# Patient Record
Sex: FEMALE | Race: WHITE
[De-identification: names, ages, dates, MRNs, and addresses within clinical notes are randomized per-mention and may not be internally consistent; named-entity substitution may affect disease eponyms.]

---

## 2017-08-29 ENCOUNTER — HOSPITAL ENCOUNTER (OUTPATIENT)
Dept: HOSPITAL 84 - D.CATH | Age: 72
Discharge: HOME | End: 2017-08-29
Attending: INTERNAL MEDICINE
Payer: MEDICARE

## 2017-08-29 VITALS — DIASTOLIC BLOOD PRESSURE: 61 MMHG | SYSTOLIC BLOOD PRESSURE: 152 MMHG

## 2017-08-29 VITALS — HEIGHT: 64 IN | WEIGHT: 174.37 LBS | BODY MASS INDEX: 29.77 KG/M2 | BODY MASS INDEX: 29.77 KG/M2

## 2017-08-29 DIAGNOSIS — I10: ICD-10-CM

## 2017-08-29 DIAGNOSIS — Z01.812: ICD-10-CM

## 2017-08-29 DIAGNOSIS — R06.02: ICD-10-CM

## 2017-08-29 DIAGNOSIS — I25.119: Primary | ICD-10-CM

## 2017-08-29 DIAGNOSIS — R94.30: ICD-10-CM

## 2017-08-29 DIAGNOSIS — I35.9: ICD-10-CM

## 2017-08-29 LAB
ANION GAP SERPL CALC-SCNC: 13.3 MMOL/L (ref 8–16)
BASOPHILS NFR BLD AUTO: 0.4 % (ref 0–2)
BUN SERPL-MCNC: 19 MG/DL (ref 7–18)
CALCIUM SERPL-MCNC: 9.2 MG/DL (ref 8.5–10.1)
CHLORIDE SERPL-SCNC: 108 MMOL/L (ref 98–107)
CO2 SERPL-SCNC: 26.2 MMOL/L (ref 21–32)
CREAT SERPL-MCNC: 0.9 MG/DL (ref 0.6–1.3)
EOSINOPHIL NFR BLD: 2.7 % (ref 0–7)
ERYTHROCYTE [DISTWIDTH] IN BLOOD BY AUTOMATED COUNT: 13.6 % (ref 11.5–14.5)
GLUCOSE SERPL-MCNC: 90 MG/DL (ref 74–106)
HCT VFR BLD CALC: 35.4 % (ref 36–48)
HGB BLD-MCNC: 12.3 G/DL (ref 12–16)
IMM GRANULOCYTES NFR BLD: 0.2 % (ref 0–5)
LYMPHOCYTES NFR BLD AUTO: 30.7 % (ref 15–50)
MCH RBC QN AUTO: 34.2 PG (ref 26–34)
MCHC RBC AUTO-ENTMCNC: 34.7 G/DL (ref 31–37)
MCV RBC: 98.3 FL (ref 80–100)
MONOCYTES NFR BLD: 6.7 % (ref 2–11)
NEUTROPHILS NFR BLD AUTO: 59.3 % (ref 40–80)
OSMOLALITY SERPL CALC.SUM OF ELEC: 286 MOSM/KG (ref 275–300)
PLATELET # BLD: 189 10X3/UL (ref 130–400)
PMV BLD AUTO: 11.4 FL (ref 7.4–10.4)
POTASSIUM SERPL-SCNC: 4.5 MMOL/L (ref 3.5–5.1)
RBC # BLD AUTO: 3.6 10X6/UL (ref 4–5.4)
SODIUM SERPL-SCNC: 143 MMOL/L (ref 136–145)
WBC # BLD AUTO: 5.5 10X3/UL (ref 4.8–10.8)

## 2017-08-29 NOTE — HEMODYNAMI
PATIENT:MAYRA MARTINEZ                                MEDICAL RECORD: [id number]
: 49                                            LOCATION:[facility]          
ACCT# [id number]                                       ADMISSION DATE: 17
 
 
 Generatedon:201713:49
Patient name: MAYRA MARTINEZ Patient #: [id number] Visit #: [id number] SSN: 4
81-
: 1945 Date of study: 2017
Page: Of
Hemodynamic Procedure Report
****************************
Patient Data
Patient Demographics
Procedure consent was obtained
First Name: MAYRA        Gender: Female
Last Name: MICHELLE           : 1945
Middle Initial: J           Age: 72 year(s)
Patient #: [id number]       Race: 
Visit #: [id number]
SSN: 
Accession #:
[id number]
Additional ID: [id number]
Contact details
Address: [address] [place] [address]       Phone: [phone]
State: [place]
City: [facility]
Zip code: [address]
Past Medical History
Allergies
Allergen        Reaction        Date         Comments
Reported
Other allergy                   2017    Ace Inhibitors
Admission
Admission Data
Admission Date: 2017   Admission Time: 11:04
Arrival Date: 2017     Arrival Time: 13:00
Admit Source: Other         Insurance Payor: Medicare
Height (in.): 64            BSA: 1.84 (m2)
Height (cm.): 162.56        BMI: 29.87 (kg/m2)
Weight (lbs.): 174
Weight (kg.): 78.93
Lab Results
Lab Result Date: 2017  Lab Result Time: 0:00
Biochemistry
Name         Units    Result                Min      Max
BUN          mg/dl    19       --(----)*-   7        18
Creatinine   mg/dl    0.9      --(-*--)--   0.6      1.3
CBC
Name         Units    Result                Min      Max
Hemoglobin   g/dl     12.3     *-(----)--   13.5     17.5
Procedure
Procedure Types
Cath Procedure
Diagnostic Procedure
C
Coronaries only
Aortic Root Angiography
 
Miscellaneous Procedures
Moderate Sedation up to 30 minutes
Procedure Description
Procedure Date
Procedure Date: 2017
Procedure Start Time: 13:23
Procedure End Time: 13:48
Procedure Staff
Name                            Function
Tramaine Lopez MD                   Performing Physician
Anna Trinidad RT               Scrub
Timoteo Anaya RT                  Scrub
Andres Wilkinson RN                Nurse
Beatriz Shane RT                    Monitor
Procedure Data
Cath Procedure
Fluoroscopy
Diagnostic fluoroscopy      Total fluoroscopy Time: 5.1
time: 5.1 min               min
Diagnostic fluoroscopy      Total fluoroscopy dose: 570
dose: 570 mGy               mGy
Contrast Material
Contrast Material Type                       Amount (ml)
Isovue 300                                   126
Entry Location
Entry     Primary  Successful  Side  Size  Upsize Upsize Entry    Closure Succes
sful  Closure
Location                             (Fr)  1 (Fr) 2 (Fr) Remarks  Device        
      Remarks
Femoral                        Right 5 Fr                         Exoseal
artery
Estimated blood loss: 5 ml
Diagnostic catheters
Device Type               Used For           End Catheter
Placement
Cordis 5Fr JL 4.0         Left Coronary
Catheter (MP)             Angiography
Cordis 5Fr 3DRC Catheter  Right Coronary
(MP)                      Angiography
Cordis 5Fr Pigtail        LV Angiography
Catheter (MP)
Diagnostic Infinity 5Fr   Multi-vessel
IM catheter               Angiography
Procedure Complications
No complications
Procedure Medications
Medication           Administration Route Dosage
Versed               I.V.                 1 mg
Fentanyl             I.V.                 50 mcg
Versed               I.V.                 1 mg
Fentanyl             I.V.                 50 mcg
Hemodynamics
Rest
BSA: 1.84 (m2) HGB: 12.3 (g/dl) O2 Consumption: Estimated: 162.18 (ml/min) O2 Co
nsumption
indexed: Estimated:88.14 (ml/min/m) Heart Rate: 59 (bpm)
Snapshots
Pre Cath      Intra         NCS           Post Cath
Vital Signs
Time     Heart  Resp   SPO2 etCO2   PY2kubg NIBP (mmHg) Rhythm  Pain    Sedation
 
Rate   (ipm)  (%)  (mmHg)  (mmHg)                      Status  Level
(bpm)
13:08:54 57     28     99   0       0       168/65(124) NSR     0 (11)  10(A)
, No
pain
13:13:23 56     29     96   0       0       161/66(113) NSR     0 (11)  10(A)
, No
pain
13:17:37 70     16     90   0       0       138/73(100) NSR     0 (11)  10(A)
, No
pain
13:21:59 56     19     95   0       0       136/60(118) NSR     0 (11)  9(A)
, No
pain
13:26:13 63     16     96   0       0       133/68(78)  NSR     0 (11)  9(A)
, No
pain
13:30:27 70     16     90   0       0       132/67(92)  NSR     0 (11)  9(A)
, No
pain
13:34:49 68     18     92   0       0       123/57(92)  NSR     0 (11)  9(A)
, No
pain
13:39:01 71     20     93   0       0       133/69(108) NSR     0 (11)  9(A)
, No
pain
13:43:19 70     19     93   0       0       142/68(96)  NSR     0 (11)  9(A)
, No
pain
13:47:37 69     9      94   0       0       137/71(106) NSR     0 (11)  10(A)
, No
pain
Medications
Time     Medication  Route  Dose  Verified Delivered Reason   Notes  Effectivene
ss
by       by
13:16:14 Versed      I.V.   1 mg  Andres   Andres    for
Minh Wilkinson RN sedation
RN
13:16:34 Fentanyl    I.V.   50    Andres   Andres    for
misty Wilkinson RN sedation
RN
13:21:47 Versed      I.V.   1 mg  Andres Singhy    for
Minh Wilkinson RN sedation
RN
13:21:55 Fentanyl    I.V.   50    Andres   Andres    for
misty Wilkinson RN sedation
RN
Procedure Log
Time     Note
12:50:29 Andres Wilkinson RN sent for patient. Start room use.
13:01:50 Diagnostic Cath Status : Elective
13:02:31 Patient Height : 64 cm
13:02:35 Patient Weight : 174 kg
13:02:35 Admit Source: Other
13:03:14 Insurance Payor : Medicare
13:03:20 Arrival Date: 2017 1:00:00 PM
13:03:38 Time tracking: Regular hours
13:03:45 Plan of Care:Hemodynamics will remain stable., Cardiac
rhythm will remain stable., Comfort level will be
 
maintained., Respiratory function will remain
adequate., Patient/ family verbilizes understanding of
procedure., Procedure tolerated without complication.,
Recovers from procedure without complications..
13:04:28 Patient received from Pre/Post Procedure Room to [facility] 1
Alert and oriented. Tansferred to table in Supine
position.
13:04:31 Warm blankets applied, and shira hugger turned on for
patient comfort.
13:04:32 Correct patient and procedure confirmed by team.
13:04:34 Signed procedure consent form obtained from patient.
13:04:35 ECG and BP/O2 sat monitors applied to patient.
13:07:33 Vital chart was started
13:09:57 Baseline sample Acquired.
13:10:34 H&P Date Dictated: 2017 Within 30 days and on
chart., H&P Addendum completed by physician on day of
procedure. (MUST COMPLETE FOR ALL OUTPATIENTS).
13:10:36 Pre-procedure instructions explained to patient.
13:10:36 Pre-op teaching completed and patient verbalized
understanding.
13:10:38 Family in waiting room.
13:10:39 Patient NPO since Midnight.
13:10:55 Patient allergic to Other allergyAce Inhibitors
13:11:08 Is the patient allergic to Iodine/contrast media? No.
13:11:10 Was the patient premedicated? No
13:11:13 Is patient on blood thinner?Yes
13:11:18 **ACC** The patient was administered the following
blood thiners within the last 24 hours: **ACC**Plavix
13:13:10 Patient diabetic? No.
13:13:12 Previous problem with sedation/anesthesia? No ?
13:13:15 Snore? Yes
13:13:16 Sleep apnea? No
13:13:18 Deviated septum? No
13:13:18 Opens mouth fully? Yes
13:13:19 Sticks out tongue? Yes
13:13:22 Airway obstruction? No ?
13:13:25 Dentures? No ?
13:13:30 Pre procedure: right dorsailis pedis pulse 1+
Palpable, but thready & weak; easily obliterated
13:13:32 Patient pain scale 0/10 ?.
13:13:43 IV patent on arrival in left forearm with 0.9% NaCl at
O.
13:15:58 Lab Result : Creatinine 0.9 mg/dl
13:15:58 Lab Result : BUN 19 mg/dl
13:15:58 Lab Result : Hemoglobin 12.3 g/dl
13:16:04 Lab results completed and on chart.
13:16:07 Right groin area was prepped with chlora-prep and
draped in sterile fashion
13:16:09 Alarms reviewed by R. N.
13:16:09 Sharps counted by scrub and verified by R.N.
13:16:12 Physician arrived
13:16:12 --------ALL STOP TIME OUT------
13:16:13 Final Timeout: patient, procedure, and site verified
with staff and physician. All members of the team are
in agreement.
13:16:14 Versed 1 mg I.V. was administered by Andres Wilkinson RN;
for sedation;
13:16:16 Right groin site verified by team.
13:16:19 Physical assessment completed. ASA score P 2 - A
patient with mild systemic disease as per Tramaine Lopez MD.
13:16:23 Sedation plan: IV Moderate Sedation Versed, Fentanyl
13:16:33 Use device set Femoral Dx
13:16:34 Fentanyl 50 mcg I.V. was administered by Andres Wilkinson
RN; for sedation;
13:16:35 Acist Syringe opened to sterile field.
13:16:35 Bag Decanter opened to sterile field.
13:16:36 Medline Cath Pack opened to sterile field.
13:16:36 Terumo 5Fr [place] Sheath opened to sterile field.
13:16:37 [place] 260cm J .035 wire opened to sterile field.
13:16:38 Acist Hand Control opened to sterile field.
13:16:39 Acist Manifold opened to sterile field.
13:16:39 Diagnostic Infinity 5Fr Multipack catheter opened to
sterile field.
13:16:40 Tegaderm 4 x 4 opened to sterile field.
13:19:12 Zero performed for pressure channel P1
13:19:20 Zero performed for pressure channel P1
13:21:47 Versed 1 mg I.V. was administered by Andres Wilkinson RN;
for sedation;
13:21:55 Fentanyl 50 mcg I.V. was administered by Andres Wilkinson
RN; for sedation;
13:23:30 Procedure started.
13:23:30 Full Disclosure recording started
13:23:34 Local anesthetic to right femoral artery with
Lidocaine 2% by Tramaine Lopez MD.**INITIAL ACCESS ONLY**
13:23:55 A 5 Fr sheath was inserted into the Right Femoral
artery
13:26:20 A Cordis 5Fr JL 4.0 Catheter (EARNEST) was advanced over
the wire and used for Left Coronary Angiography.
13:27:45 LCA angiography performed.
13:27:49 Injector settings: Ml/sec: 3, Volume: 6,
13:30:35 Catheter removed.
13:30:57 A Cordis 5Fr 3DRC Catheter (MP) was advanced over the
wire and used for Right Coronary Angiography.
13:31:46 RCA angiography performed.
13:31:50 Injector settings: Ml/sec: 3, Volume: 6,
13:34:14 Catheter removed.
13:34:21 A Cordis 5Fr Pigtail Catheter (MP) was advanced over
the wire and used for LV Angiography.
13:35:46 Aortic Root visualized
13:36:32 Catheter removed.
13:37:06 A Diagnostic Infinity 5Fr IM catheter was advanced
over the wire and used for Multi-vessel Angiography.
13:38:43 Bilateral carotid angiography performed.
13:42:41 Catheter removed.
13:45:02 Cordis 5Fr Exoseal opened to sterile field.
13:45:23 Sheath removed intact; hemostasis achieved with
Exoseal to the Right Femoral artery.
13:45:26 Procedure ended.(Physican Out)
13:46:27 Fluoroscopy time 05.10 minutes.
13:46:31 Fluoroscopy dose: 570 mGy
13:46:31 Flurop Dose total: 570
13:47:02 Contrast amount:Isovue 300 126ml.
13:47:04 Sharps counted by scrub and verified by R.N.
13:47:05 Insertion/operative site no bleeding no hematoma.
13:47:08 Post-op/insertion site Right Femoral artery dressed
using a 4 x 4 and Tegaderm.
13:47:16 Post right femoral artery:stable
13:47:18 Post Procedure Pulses reassessed and unchanged
13:47:21 Post procedure rhythm: unchanged.
 
13:47:24 Estimated blood loss: 5 ml
13:47:26 Post procedure instruction explained to
patient.Patient verbalizes understanding.
13:47:26 Patient needs reinforcement of post procedure
teaching.
13:48:06 Procedure type changed to Cath procedure, Diagnostic
procedure, LHC, Coronaries only, Aortic Root
Angiography, Miscellaneous Procedures, Moderate
Sedation up to 30 minutes
13:48:08 Procedure and supply charges have been captured,
reviewed, submitted and are correct.
13:48:14 Procedure Complication : No complications
13:48:19 Vital chart was stopped
13:48:19 See physician's report for complete and final results.
13:48:22 Report given to Pre/Post Procedure Room.
13:48:26 Patient transfered to Pre/Post Procedure Room with
Stretcher.
13:48:33 Procedure ended.
13:48:33 Full Disclosure recording stopped
13:48:41 End room use (Document Last)
Device Usage
Item Name  Manufacture  Quantity  Catalog    Hospital Part    Current Minimal Lo
t# /
Number     Charge   Number  Stock   Stock   Serial#
Code
Acist      Acist        1         47001      621462   724395  723820  20
Syringe    Medical
Systems Inc
Bag        Microtek     1         2002S      656528   58227   127382  5
Decanter   Medical Inc.
Medline    Cardinal     1         [id number]  496299   76627   243795  5
Cath Pack  Health
Terumo 5Fr Terumo       1         [id number]     900113   023488  619140  40
[place]
Sheath
[facility] [place]    [employer]      1         879360     827250   078399  913020  30
260cm J
.035 wire
Acist Hand Acist        1         30743      517083   005899  290820  5
Control    Medical
Systems Inc
Acist      Acist        1         65318      551744   668350  718685  5
Manifold   Medical
Systems Inc
Diagnostic Cardinal     1         [id number]     576289   41937   643732  30
Infinity   Health
5Fr
Multipack
catheter
Tegaderm 4 3M           1         1626W      906531   000010  355223  5
x 4
Cordis 5Fr Cardinal     1                                     247762  5
JL 4.0     Health
Catheter
(MP)
Cordis 5Fr Cardinal     1                                     076566  5
3DRC       Health
Catheter
(MP)
Cordis 5Fr Cardinal     1                                     578382  5
 
Pigtail    Health
Catheter
(MP)
Diagnostic Cardinal     1         [id number]    665169   388304  826597  5
Infinity   Health
5Fr IM
catheter
Cordis 5Fr Cardinal     1               009063   683137  804813  10
[facility]    [employer]
Signature Audit [place]
Stage           Time        Signature      Unsigned
Intra-Procedure 2017   Beatriz Shane
1:49:55 PM  RT(R)
Signatures
Monitor : Beatriz Shane RT   Signature :
______________________________
Date : ______________ Time :
______________
 
 
 
 
 
 
 
 
 
 
 
 
 
 
 
 
 
 
 
 
 
 
 
 
 
 
 
 
 
 
 
 
 
 
 
 
 
[facility]                                          
[address] [place] [address]

## 2017-08-29 NOTE — NUR
IV D'C WITH CATH TIP INTACT, UP TO RESTROOM- VOID.  WRITTEN AND
VERBAL D'C INSTRUCTIONS GIVEN TO PT AND FRIEND. DENIES PAIN OR
FURTHUR NEEDS.

## 2017-08-31 ENCOUNTER — HOSPITAL ENCOUNTER (OUTPATIENT)
Dept: HOSPITAL 84 - D.CT | Age: 72
Discharge: HOME | End: 2017-08-31
Attending: INTERNAL MEDICINE
Payer: MEDICARE

## 2017-08-31 VITALS — BODY MASS INDEX: 29.9 KG/M2

## 2017-08-31 DIAGNOSIS — I65.23: Primary | ICD-10-CM

## 2017-09-08 ENCOUNTER — HOSPITAL ENCOUNTER (OUTPATIENT)
Dept: HOSPITAL 84 - D.US | Age: 72
Discharge: HOME | End: 2017-09-08
Attending: THORACIC SURGERY (CARDIOTHORACIC VASCULAR SURGERY)
Payer: MEDICARE

## 2017-09-08 VITALS — BODY MASS INDEX: 29.9 KG/M2

## 2017-09-08 DIAGNOSIS — R09.89: ICD-10-CM

## 2017-09-08 DIAGNOSIS — I25.10: Primary | ICD-10-CM

## 2017-09-08 DIAGNOSIS — Z01.812: ICD-10-CM

## 2017-09-09 LAB — HCV AB S/CO SERPL IA: <0.1 (ref 0–0.9)

## 2017-09-14 LAB
ALBUMIN SERPL-MCNC: 4 G/DL (ref 3.4–5)
ALP SERPL-CCNC: 74 U/L (ref 46–116)
ALT SERPL-CCNC: 44 U/L (ref 10–68)
ANION GAP SERPL CALC-SCNC: 9.3 MMOL/L (ref 8–16)
APPEARANCE UR: CLEAR
APTT BLD: 28.9 SECONDS (ref 22.8–39.4)
BASOPHILS NFR BLD AUTO: 0.2 % (ref 0–2)
BILIRUB SERPL-MCNC: 0.51 MG/DL (ref 0.2–1.3)
BILIRUB SERPL-MCNC: NEGATIVE MG/DL
BUN SERPL-MCNC: 17 MG/DL (ref 7–18)
CA TITR SERPL AGGL: (no result) {TITER}
CALCIUM SERPL-MCNC: 9.2 MG/DL (ref 8.5–10.1)
CHLORIDE SERPL-SCNC: 106 MMOL/L (ref 98–107)
CO2 SERPL-SCNC: 30.7 MMOL/L (ref 21–32)
COLD SCREEN ROOM TEMP: NEGATIVE
COLOR UR: YELLOW
CREAT SERPL-MCNC: 0.9 MG/DL (ref 0.6–1.3)
EOSINOPHIL NFR BLD: 3.2 % (ref 0–7)
ERYTHROCYTE [DISTWIDTH] IN BLOOD BY AUTOMATED COUNT: 14 % (ref 11.5–14.5)
EST. AVERAGE GLUCOSE BLD GHB EST-MCNC: 100 MG/DL (ref 74–154)
GLOBULIN SER-MCNC: 3.5 G/L
GLUCOSE SERPL-MCNC: 96 MG/DL (ref 74–106)
GLUCOSE SERPL-MCNC: NEGATIVE MG/DL
HBA1C MFR BLD: 5.1 % (ref 4.8–6)
HCT VFR BLD CALC: 35.4 % (ref 36–48)
HGB BLD-MCNC: 12.1 G/DL (ref 12–16)
IMM GRANULOCYTES NFR BLD: 0 % (ref 0–5)
INR PPP: 0.95 (ref 0.85–1.17)
KETONES UR STRIP-MCNC: NEGATIVE MG/DL
LEUKOCYTE ESTERASE: NEGATIVE
LYMPHOCYTES NFR BLD AUTO: 29.3 % (ref 15–50)
MCH RBC QN AUTO: 34.2 PG (ref 26–34)
MCHC RBC AUTO-ENTMCNC: 34.2 G/DL (ref 31–37)
MCV RBC: 100 FL (ref 80–100)
MONOCYTES NFR BLD: 7.2 % (ref 2–11)
NEUTROPHILS NFR BLD AUTO: 60.1 % (ref 40–80)
NITRITE UR-MCNC: NEGATIVE MG/ML
OSMOLALITY SERPL CALC.SUM OF ELEC: 282 MOSM/KG (ref 275–300)
PA ADP PRP-ACNC: 257 PRU (ref 194–418)
PH UR STRIP: 7 [PH] (ref 5–6)
PHOSPHATE SERPL-MCNC: 3.9 MG/DL (ref 2.5–4.9)
PLATELET # BLD: 167 10X3/UL (ref 130–400)
PMV BLD AUTO: 11.7 FL (ref 7.4–10.4)
POTASSIUM SERPL-SCNC: 5 MMOL/L (ref 3.5–5.1)
PROT SERPL-MCNC: 7.5 G/DL (ref 6.4–8.2)
PROT UR-MCNC: NEGATIVE MG/DL
PROTHROMBIN TIME: 12.5 SECONDS (ref 11.6–15)
RBC # BLD AUTO: 3.54 10X6/UL (ref 4–5.4)
SODIUM SERPL-SCNC: 141 MMOL/L (ref 136–145)
SP GR UR STRIP: 1.01 (ref 1–1.02)
T4 FREE SERPL-MCNC: 1.01 NG/DL (ref 0.76–1.46)
TSH SERPL-ACNC: 4.32 UIU/ML (ref 0.36–3.74)
URATE SERPL-MCNC: 5.6 MG/DL (ref 2.6–7.2)
UROBILINOGEN UR-MCNC: NORMAL MG/DL
WBC # BLD AUTO: 5.3 10X3/UL (ref 4.8–10.8)

## 2017-09-15 ENCOUNTER — HOSPITAL ENCOUNTER (INPATIENT)
Dept: HOSPITAL 84 - D.SDCHOLD | Age: 72
LOS: 8 days | Discharge: HOME | DRG: 236 | End: 2017-09-23
Attending: THORACIC SURGERY (CARDIOTHORACIC VASCULAR SURGERY) | Admitting: THORACIC SURGERY (CARDIOTHORACIC VASCULAR SURGERY)
Payer: MEDICARE

## 2017-09-15 VITALS — SYSTOLIC BLOOD PRESSURE: 121 MMHG | DIASTOLIC BLOOD PRESSURE: 53 MMHG

## 2017-09-15 VITALS — DIASTOLIC BLOOD PRESSURE: 49 MMHG | SYSTOLIC BLOOD PRESSURE: 120 MMHG

## 2017-09-15 VITALS — SYSTOLIC BLOOD PRESSURE: 129 MMHG | DIASTOLIC BLOOD PRESSURE: 58 MMHG

## 2017-09-15 VITALS — DIASTOLIC BLOOD PRESSURE: 65 MMHG | SYSTOLIC BLOOD PRESSURE: 137 MMHG

## 2017-09-15 VITALS — SYSTOLIC BLOOD PRESSURE: 117 MMHG | DIASTOLIC BLOOD PRESSURE: 60 MMHG

## 2017-09-15 VITALS — SYSTOLIC BLOOD PRESSURE: 119 MMHG | DIASTOLIC BLOOD PRESSURE: 48 MMHG

## 2017-09-15 VITALS — DIASTOLIC BLOOD PRESSURE: 46 MMHG | SYSTOLIC BLOOD PRESSURE: 104 MMHG

## 2017-09-15 VITALS — DIASTOLIC BLOOD PRESSURE: 58 MMHG | SYSTOLIC BLOOD PRESSURE: 141 MMHG

## 2017-09-15 VITALS — SYSTOLIC BLOOD PRESSURE: 107 MMHG | DIASTOLIC BLOOD PRESSURE: 42 MMHG

## 2017-09-15 VITALS — SYSTOLIC BLOOD PRESSURE: 117 MMHG | DIASTOLIC BLOOD PRESSURE: 42 MMHG

## 2017-09-15 VITALS — DIASTOLIC BLOOD PRESSURE: 46 MMHG | SYSTOLIC BLOOD PRESSURE: 106 MMHG

## 2017-09-15 VITALS — SYSTOLIC BLOOD PRESSURE: 108 MMHG | DIASTOLIC BLOOD PRESSURE: 43 MMHG

## 2017-09-15 VITALS — DIASTOLIC BLOOD PRESSURE: 46 MMHG | SYSTOLIC BLOOD PRESSURE: 111 MMHG

## 2017-09-15 VITALS — DIASTOLIC BLOOD PRESSURE: 53 MMHG | SYSTOLIC BLOOD PRESSURE: 134 MMHG

## 2017-09-15 VITALS — DIASTOLIC BLOOD PRESSURE: 46 MMHG | SYSTOLIC BLOOD PRESSURE: 97 MMHG

## 2017-09-15 VITALS — SYSTOLIC BLOOD PRESSURE: 168 MMHG | DIASTOLIC BLOOD PRESSURE: 77 MMHG

## 2017-09-15 VITALS
BODY MASS INDEX: 30.45 KG/M2 | HEIGHT: 64 IN | BODY MASS INDEX: 30.45 KG/M2 | WEIGHT: 178.37 LBS | WEIGHT: 178.37 LBS | HEIGHT: 64 IN | BODY MASS INDEX: 30.45 KG/M2 | BODY MASS INDEX: 30.45 KG/M2

## 2017-09-15 VITALS — DIASTOLIC BLOOD PRESSURE: 46 MMHG | SYSTOLIC BLOOD PRESSURE: 115 MMHG

## 2017-09-15 VITALS — DIASTOLIC BLOOD PRESSURE: 58 MMHG | SYSTOLIC BLOOD PRESSURE: 142 MMHG

## 2017-09-15 VITALS — SYSTOLIC BLOOD PRESSURE: 117 MMHG | DIASTOLIC BLOOD PRESSURE: 66 MMHG

## 2017-09-15 VITALS — DIASTOLIC BLOOD PRESSURE: 47 MMHG | SYSTOLIC BLOOD PRESSURE: 125 MMHG

## 2017-09-15 VITALS — DIASTOLIC BLOOD PRESSURE: 46 MMHG | SYSTOLIC BLOOD PRESSURE: 117 MMHG

## 2017-09-15 VITALS — DIASTOLIC BLOOD PRESSURE: 50 MMHG | SYSTOLIC BLOOD PRESSURE: 120 MMHG

## 2017-09-15 VITALS — SYSTOLIC BLOOD PRESSURE: 105 MMHG | DIASTOLIC BLOOD PRESSURE: 45 MMHG

## 2017-09-15 VITALS — SYSTOLIC BLOOD PRESSURE: 123 MMHG | DIASTOLIC BLOOD PRESSURE: 47 MMHG

## 2017-09-15 VITALS — SYSTOLIC BLOOD PRESSURE: 120 MMHG | DIASTOLIC BLOOD PRESSURE: 56 MMHG

## 2017-09-15 VITALS — SYSTOLIC BLOOD PRESSURE: 143 MMHG | DIASTOLIC BLOOD PRESSURE: 63 MMHG

## 2017-09-15 VITALS — SYSTOLIC BLOOD PRESSURE: 117 MMHG | DIASTOLIC BLOOD PRESSURE: 46 MMHG

## 2017-09-15 VITALS — DIASTOLIC BLOOD PRESSURE: 47 MMHG | SYSTOLIC BLOOD PRESSURE: 121 MMHG

## 2017-09-15 VITALS — DIASTOLIC BLOOD PRESSURE: 47 MMHG | SYSTOLIC BLOOD PRESSURE: 108 MMHG

## 2017-09-15 VITALS — DIASTOLIC BLOOD PRESSURE: 60 MMHG | SYSTOLIC BLOOD PRESSURE: 114 MMHG

## 2017-09-15 VITALS — SYSTOLIC BLOOD PRESSURE: 145 MMHG | DIASTOLIC BLOOD PRESSURE: 63 MMHG

## 2017-09-15 VITALS — DIASTOLIC BLOOD PRESSURE: 44 MMHG | SYSTOLIC BLOOD PRESSURE: 113 MMHG

## 2017-09-15 VITALS — DIASTOLIC BLOOD PRESSURE: 58 MMHG | SYSTOLIC BLOOD PRESSURE: 133 MMHG

## 2017-09-15 VITALS — SYSTOLIC BLOOD PRESSURE: 113 MMHG | DIASTOLIC BLOOD PRESSURE: 44 MMHG

## 2017-09-15 VITALS — SYSTOLIC BLOOD PRESSURE: 116 MMHG | DIASTOLIC BLOOD PRESSURE: 46 MMHG

## 2017-09-15 VITALS — SYSTOLIC BLOOD PRESSURE: 113 MMHG | DIASTOLIC BLOOD PRESSURE: 43 MMHG

## 2017-09-15 DIAGNOSIS — I73.9: ICD-10-CM

## 2017-09-15 DIAGNOSIS — E03.9: ICD-10-CM

## 2017-09-15 DIAGNOSIS — I10: ICD-10-CM

## 2017-09-15 DIAGNOSIS — R91.1: ICD-10-CM

## 2017-09-15 DIAGNOSIS — J98.11: ICD-10-CM

## 2017-09-15 DIAGNOSIS — I25.10: Primary | ICD-10-CM

## 2017-09-15 DIAGNOSIS — E78.5: ICD-10-CM

## 2017-09-15 DIAGNOSIS — Z87.891: ICD-10-CM

## 2017-09-15 LAB
ANION GAP SERPL CALC-SCNC: 14.5 MMOL/L (ref 8–16)
APTT BLD: 36.4 SECONDS (ref 22.8–39.4)
BUN SERPL-MCNC: 16 MG/DL (ref 7–18)
CALCIUM SERPL-MCNC: 7.3 MG/DL (ref 8.5–10.1)
CHLORIDE SERPL-SCNC: 113 MMOL/L (ref 98–107)
CO2 SERPL-SCNC: 27.5 MMOL/L (ref 21–32)
CREAT SERPL-MCNC: 0.7 MG/DL (ref 0.6–1.3)
ERYTHROCYTE [DISTWIDTH] IN BLOOD BY AUTOMATED COUNT: 14.9 % (ref 11.5–14.5)
GLUCOSE SERPL-MCNC: 195 MG/DL (ref 74–106)
HCT VFR BLD CALC: 30.4 % (ref 36–48)
HGB BLD-MCNC: 10.7 G/DL (ref 12–16)
INR PPP: 1.42 (ref 0.85–1.17)
MCH RBC QN AUTO: 33.1 PG (ref 26–34)
MCHC RBC AUTO-ENTMCNC: 35.2 G/DL (ref 31–37)
MCV RBC: 94.1 FL (ref 80–100)
OSMOLALITY SERPL CALC.SUM OF ELEC: 305 MOSM/KG (ref 275–300)
PA ADP PRP-ACNC: 249 PRU (ref 194–418)
PLATELET # BLD: 106 10X3/UL (ref 130–400)
PMV BLD AUTO: 11 FL (ref 7.4–10.4)
POTASSIUM SERPL-SCNC: 4 MMOL/L (ref 3.5–5.1)
PROTHROMBIN TIME: 17.2 SECONDS (ref 11.6–15)
RBC # BLD AUTO: 3.23 10X6/UL (ref 4–5.4)
SODIUM SERPL-SCNC: 151 MMOL/L (ref 136–145)
WBC # BLD AUTO: 10.2 10X3/UL (ref 4.8–10.8)

## 2017-09-15 PROCEDURE — 5A1221Z PERFORMANCE OF CARDIAC OUTPUT, CONTINUOUS: ICD-10-PCS | Performed by: THORACIC SURGERY (CARDIOTHORACIC VASCULAR SURGERY)

## 2017-09-15 PROCEDURE — B245ZZ4 ULTRASONOGRAPHY OF LEFT HEART, TRANSESOPHAGEAL: ICD-10-PCS | Performed by: INTERNAL MEDICINE

## 2017-09-15 PROCEDURE — 02100AC BYPASS CORONARY ARTERY, ONE ARTERY FROM THORACIC ARTERY WITH AUTOLOGOUS ARTERIAL TISSUE, OPEN APPROACH: ICD-10-PCS | Performed by: THORACIC SURGERY (CARDIOTHORACIC VASCULAR SURGERY)

## 2017-09-15 PROCEDURE — 021109W BYPASS CORONARY ARTERY, TWO ARTERIES FROM AORTA WITH AUTOLOGOUS VENOUS TISSUE, OPEN APPROACH: ICD-10-PCS | Performed by: THORACIC SURGERY (CARDIOTHORACIC VASCULAR SURGERY)

## 2017-09-15 PROCEDURE — 06BP0ZZ EXCISION OF RIGHT SAPHENOUS VEIN, OPEN APPROACH: ICD-10-PCS | Performed by: THORACIC SURGERY (CARDIOTHORACIC VASCULAR SURGERY)

## 2017-09-15 NOTE — NUR
ABG'S DRAWN FROM RT RADIAL A-LINE WITHOUT DIFFICULTY. WILL TRANSFUSE 2 UNITS
PRBC'S, AND 10MEQ KCL. WILL MONITOR. 1:1 NURSING CARE IN PROGRESS.

## 2017-09-15 NOTE — NUR
1ST UNIT OF PRBC'S STARTED VIA RT IJ CORDIS. REASSESSMENT COMPLETED. SEE
ASSESSMENT FLOWSHEET. NO RUB HEARD IN LUNG LOBE VASQUEZ. TOLERATING WATER.
DENIES NAUSEA. HYPOACTIVE BOWEL SOUNDS NOW HEARD. WILL MONITOR.

## 2017-09-15 NOTE — NUR
2100 MEDS GIVEN EASILY WITH WATER. NO DIFFICULTY SWALLOWING. WANTS MORE WATER
BUT WILL HOLD OFF 30 MINUTES TO ONE HOUR DUE TO NO BOWEL SOUNDS HEARD YET.
MORPHINE PCA BUTTON PRESSED, WILL TURN ONCE PAIN MEDS KICKS IN. WILL MONITOR.

## 2017-09-15 NOTE — NUR
DR RYAN CALLED WITH LATEST BLOOD GAS TOTALS. ASKS FOR 20MEQ OVER 2 HOURS TO
BE GIVEN TO COVER LOW POTASSIUM.

## 2017-09-15 NOTE — TEE
PATIENT:MAYRA MARTINEZ               MEDICAL RECORD: [id number]
                                               LOCATION:[place] [address]
AGE OF PATIENT: 72                             ADMISSION DATE: 09/15/17
SEX: F   
 
REFERRING PHYSICIAN:                                                             
 
INTERPRETING PHYSICIAN: ROSA LEES MD             

 
 
                         TRANSESOPHAGEAL ECHOCARDIOGRAM
                 KALEB CHARGE  Y
                INDICATIONS: CABG                     
 
             PREMEDICATIONS:                               
 
PATIENT'S RESPONSE PROCEDURE                          
 
                     DOPPLER MEASUREMENTS:
            LVIT            LA           PA           RA     
            LVOT          RVOT      Asc. Ao     
AV Gradient Peak       AV Mean      AV Area     
MV Gradient Peak       MV Mean      MV Area     
 INTERPRETATION:                          
 
 
 
 
               Doppler:                          
 
                   2-D: EF 50 %                  
 
     COLOR FLOW DOPPLER MILD MR/TRACE TR         
 
   NORMAL SALINE STUDY:                     
 
          MISCELLANOUS:                          
 
             DIAGNOSIS:                          
 
                  PLAN:                          
 
           Cardiologist:2          Dr. Lopez                
   Cardiac Sonographer: 1               KISHORE ORELLANA            
              COMMENTS: CONNOR PATIENT                               
                                                                                     
 
 
DATE OF SERVICE:  09/15/2017
 
PROCEDURE:  Transesophageal echo evaluation of valvular structures during bypass
surgery.
 
FINDINGS:
1.  Left ventricular chamber size is within normal limits.  Left ventricular
systolic function is normal.  Overall ejection fraction estimated at 50%.
2.  Left atrium, right atrium, and right ventricle chamber sizes are within
 
 
 
TRANSESOPHAGEAL ECHOCARDIOGRAM REPORT                    [id number]    ROSALIND MARTINEZ
 
 
normal limits.
3.  Valvular structures have normal structure and motion.
4.  Doppler interrogation reveals mild mitral regurgitation, mild tricuspid
regurgitation, no other valvular insufficiency or stenosis.
5.  No evidence of pericardial effusion or left ventricular thrombus.
 
TRANSINT:[id number] Voice Confirmation ID: [id number] DOCUMENT ID: [id number]
                                           
                                           ROSA LEES MD             
 
 
 
 
 
 
 
 
 
 
 
 
 
 
 
 
 
 
 
 
 
 
 
 
 
 
 
 
 
 
 
 
 
 
 
 
 
CC:                                                             [id number]
DICTATION DATE: 09/18/17 1028     :     09/18/17 1258      ADM IN  
                                                                              
[place] [address] [place] [address]

## 2017-09-15 NOTE — NUR
CONSULT CALLED IN TO DR AGUILERA, SPOKE WITH HIM, HE IS AWARE. DR MAZARIEGOS OFFICE
CALLED, DR PEDRAZA IS ON CALL, CONSULT WILL GO TO HIM. AWAITING HIS RETURN
CALL.

## 2017-09-15 NOTE — NUR
PT ARRIVED TO ROOM VIA BED, ACCOMPANIED BY O.R. STAFF. PT IS VENTILATED. HAS
PLASMALYTE, DOPAMINE, LEVOPHED INFUSING. HAS CHEST TUBE X3. MIDSTERNAL
DRESSING INTACT AND CLEAN. SUBSTERNAL DRESSING OVER CHEST TUBES AND TEMP PACER
IS INTACT AND CLEAN. RIGHT LEG GRAFT SITES DRESSING IS INTACT. BETH DRAINS X2.
COMPRESSED. PT HAS CRITICORE MARTÍNEZ. CURRENT TEMP IS 36.4. URINE CLEAR, DARK
YELLOW. WARMING BLANKET STARTED. JAM AND SCD ON LEFT LEG ONLY AT THIS TIME.
PT IS CURRENTLY PACED WITH PACEMAKER SETTING OF DDD 90, 20, 10. HAS OGT.
VERIFIED PLACEMENT VIA AUSCULTATION. VISIBLE GASTRIC RETURN UPON PLACING
ON SUCTION. PT ETT IS SECURED. 21CM AT LIPLINE. SIZE 8.0. HAS LEFT SUBCLAVIAN
CENTRAL LINE. RIGHT IJ SWAN AT 51CM. LOCKED AND SECURED. HAS RIGHT RADIAL ART
LINE. ART, PA AND CVL ZEROED FOR ACCURACY.

## 2017-09-15 NOTE — NUR
TURNED AND REPOSITIONED TO RT SIDE SLIGHTLY WITH PILLOW SUPPORT. HEAD PILLOW
GIVEN INSTEAD OF OR PILLOW. WAS RELUCTANT TO TURN AT FIRST. INFORMED OF REASON
TO TURN. VERBALIZED UNDERSTANDING. WILL MONITOR.

## 2017-09-15 NOTE — NUR
SHIFT ASSESSMENT COMPLETED. SEE ASSESSMENT FLOWSHEET FOR DETAILS. ANSWERS
QUESTIONS APPROPRIATELY. ORIENTED X4 EXCEPT SHE WAS UNAWARE SHE HAD ALREADY
HAD THE "BYPASS". C.O PAIN TO LEFT CHEST/LEFT CHEST TUBE AREA. REPORTS IT AS
STABBING AT A 7/10 ON NUMBER SCALE. MORPHINE PCA IN USE 1MG Q 10 MINUTES PRN
IN USE AND SHOWN HOW TO USE. RUB HEARD THROUGHOUT LUNG/HEART TONE FIELDS. O2 @
4LPM/NC. ICE CHIPS STARTED TO GIVE AT SLOW RATE-NO BOWEL SOUNDS HEARD AT THIS
TIME. RT LEG COBAND DRSG C/D/I WITH 2-BETH DRAINS COMPRESSED DRAINING SANGUINOUS
DRAINAGE. CT X3 TO 20CM SUCTION. PALPABLE PERIPHERAL PULSES X4. RT RADIAL
A-LINE INTACT. TPM: DDD-90; 10; 10. A-SENSING, V-PACING. 1:1 NURSING CARE IN
PROGRESS.

## 2017-09-16 VITALS — DIASTOLIC BLOOD PRESSURE: 53 MMHG | SYSTOLIC BLOOD PRESSURE: 123 MMHG

## 2017-09-16 VITALS — SYSTOLIC BLOOD PRESSURE: 92 MMHG | DIASTOLIC BLOOD PRESSURE: 47 MMHG

## 2017-09-16 VITALS — DIASTOLIC BLOOD PRESSURE: 50 MMHG | SYSTOLIC BLOOD PRESSURE: 108 MMHG

## 2017-09-16 VITALS — SYSTOLIC BLOOD PRESSURE: 123 MMHG | DIASTOLIC BLOOD PRESSURE: 50 MMHG

## 2017-09-16 VITALS — SYSTOLIC BLOOD PRESSURE: 114 MMHG | DIASTOLIC BLOOD PRESSURE: 46 MMHG

## 2017-09-16 VITALS — DIASTOLIC BLOOD PRESSURE: 48 MMHG | SYSTOLIC BLOOD PRESSURE: 122 MMHG

## 2017-09-16 VITALS — DIASTOLIC BLOOD PRESSURE: 50 MMHG | SYSTOLIC BLOOD PRESSURE: 120 MMHG

## 2017-09-16 VITALS — SYSTOLIC BLOOD PRESSURE: 100 MMHG | DIASTOLIC BLOOD PRESSURE: 48 MMHG

## 2017-09-16 VITALS — SYSTOLIC BLOOD PRESSURE: 118 MMHG | DIASTOLIC BLOOD PRESSURE: 47 MMHG

## 2017-09-16 VITALS — DIASTOLIC BLOOD PRESSURE: 45 MMHG | SYSTOLIC BLOOD PRESSURE: 115 MMHG

## 2017-09-16 VITALS — DIASTOLIC BLOOD PRESSURE: 43 MMHG | SYSTOLIC BLOOD PRESSURE: 95 MMHG

## 2017-09-16 VITALS — DIASTOLIC BLOOD PRESSURE: 47 MMHG | SYSTOLIC BLOOD PRESSURE: 99 MMHG

## 2017-09-16 VITALS — SYSTOLIC BLOOD PRESSURE: 120 MMHG | DIASTOLIC BLOOD PRESSURE: 47 MMHG

## 2017-09-16 VITALS — DIASTOLIC BLOOD PRESSURE: 50 MMHG | SYSTOLIC BLOOD PRESSURE: 133 MMHG

## 2017-09-16 VITALS — DIASTOLIC BLOOD PRESSURE: 56 MMHG | SYSTOLIC BLOOD PRESSURE: 134 MMHG

## 2017-09-16 VITALS — SYSTOLIC BLOOD PRESSURE: 122 MMHG | DIASTOLIC BLOOD PRESSURE: 45 MMHG

## 2017-09-16 VITALS — DIASTOLIC BLOOD PRESSURE: 50 MMHG | SYSTOLIC BLOOD PRESSURE: 118 MMHG

## 2017-09-16 VITALS — DIASTOLIC BLOOD PRESSURE: 50 MMHG | SYSTOLIC BLOOD PRESSURE: 131 MMHG

## 2017-09-16 VITALS — DIASTOLIC BLOOD PRESSURE: 67 MMHG | SYSTOLIC BLOOD PRESSURE: 121 MMHG

## 2017-09-16 VITALS — SYSTOLIC BLOOD PRESSURE: 112 MMHG | DIASTOLIC BLOOD PRESSURE: 50 MMHG

## 2017-09-16 VITALS — SYSTOLIC BLOOD PRESSURE: 115 MMHG | DIASTOLIC BLOOD PRESSURE: 47 MMHG

## 2017-09-16 VITALS — SYSTOLIC BLOOD PRESSURE: 98 MMHG | DIASTOLIC BLOOD PRESSURE: 53 MMHG

## 2017-09-16 VITALS — DIASTOLIC BLOOD PRESSURE: 56 MMHG | SYSTOLIC BLOOD PRESSURE: 125 MMHG

## 2017-09-16 VITALS — DIASTOLIC BLOOD PRESSURE: 43 MMHG | SYSTOLIC BLOOD PRESSURE: 103 MMHG

## 2017-09-16 VITALS — SYSTOLIC BLOOD PRESSURE: 128 MMHG | DIASTOLIC BLOOD PRESSURE: 51 MMHG

## 2017-09-16 VITALS — DIASTOLIC BLOOD PRESSURE: 50 MMHG | SYSTOLIC BLOOD PRESSURE: 103 MMHG

## 2017-09-16 VITALS — DIASTOLIC BLOOD PRESSURE: 55 MMHG | SYSTOLIC BLOOD PRESSURE: 118 MMHG

## 2017-09-16 VITALS — SYSTOLIC BLOOD PRESSURE: 103 MMHG | DIASTOLIC BLOOD PRESSURE: 44 MMHG

## 2017-09-16 VITALS — DIASTOLIC BLOOD PRESSURE: 55 MMHG | SYSTOLIC BLOOD PRESSURE: 125 MMHG

## 2017-09-16 VITALS — DIASTOLIC BLOOD PRESSURE: 50 MMHG | SYSTOLIC BLOOD PRESSURE: 134 MMHG

## 2017-09-16 VITALS — DIASTOLIC BLOOD PRESSURE: 47 MMHG | SYSTOLIC BLOOD PRESSURE: 94 MMHG

## 2017-09-16 VITALS — SYSTOLIC BLOOD PRESSURE: 95 MMHG | DIASTOLIC BLOOD PRESSURE: 46 MMHG

## 2017-09-16 VITALS — SYSTOLIC BLOOD PRESSURE: 107 MMHG | DIASTOLIC BLOOD PRESSURE: 48 MMHG

## 2017-09-16 VITALS — SYSTOLIC BLOOD PRESSURE: 106 MMHG | DIASTOLIC BLOOD PRESSURE: 50 MMHG

## 2017-09-16 VITALS — SYSTOLIC BLOOD PRESSURE: 117 MMHG | DIASTOLIC BLOOD PRESSURE: 52 MMHG

## 2017-09-16 VITALS — SYSTOLIC BLOOD PRESSURE: 100 MMHG | DIASTOLIC BLOOD PRESSURE: 43 MMHG

## 2017-09-16 VITALS — DIASTOLIC BLOOD PRESSURE: 48 MMHG | SYSTOLIC BLOOD PRESSURE: 123 MMHG

## 2017-09-16 VITALS — DIASTOLIC BLOOD PRESSURE: 49 MMHG | SYSTOLIC BLOOD PRESSURE: 115 MMHG

## 2017-09-16 VITALS — DIASTOLIC BLOOD PRESSURE: 52 MMHG | SYSTOLIC BLOOD PRESSURE: 123 MMHG

## 2017-09-16 VITALS — SYSTOLIC BLOOD PRESSURE: 118 MMHG | DIASTOLIC BLOOD PRESSURE: 53 MMHG

## 2017-09-16 VITALS — DIASTOLIC BLOOD PRESSURE: 49 MMHG | SYSTOLIC BLOOD PRESSURE: 112 MMHG

## 2017-09-16 VITALS — DIASTOLIC BLOOD PRESSURE: 46 MMHG | SYSTOLIC BLOOD PRESSURE: 113 MMHG

## 2017-09-16 VITALS — DIASTOLIC BLOOD PRESSURE: 54 MMHG | SYSTOLIC BLOOD PRESSURE: 117 MMHG

## 2017-09-16 VITALS — SYSTOLIC BLOOD PRESSURE: 122 MMHG | DIASTOLIC BLOOD PRESSURE: 49 MMHG

## 2017-09-16 VITALS — DIASTOLIC BLOOD PRESSURE: 49 MMHG | SYSTOLIC BLOOD PRESSURE: 106 MMHG

## 2017-09-16 VITALS — SYSTOLIC BLOOD PRESSURE: 103 MMHG | DIASTOLIC BLOOD PRESSURE: 49 MMHG

## 2017-09-16 VITALS — DIASTOLIC BLOOD PRESSURE: 55 MMHG | SYSTOLIC BLOOD PRESSURE: 124 MMHG

## 2017-09-16 VITALS — SYSTOLIC BLOOD PRESSURE: 125 MMHG | DIASTOLIC BLOOD PRESSURE: 48 MMHG

## 2017-09-16 VITALS — DIASTOLIC BLOOD PRESSURE: 50 MMHG | SYSTOLIC BLOOD PRESSURE: 128 MMHG

## 2017-09-16 VITALS — SYSTOLIC BLOOD PRESSURE: 125 MMHG | DIASTOLIC BLOOD PRESSURE: 53 MMHG

## 2017-09-16 VITALS — DIASTOLIC BLOOD PRESSURE: 47 MMHG | SYSTOLIC BLOOD PRESSURE: 102 MMHG

## 2017-09-16 VITALS — SYSTOLIC BLOOD PRESSURE: 111 MMHG | DIASTOLIC BLOOD PRESSURE: 51 MMHG

## 2017-09-16 VITALS — DIASTOLIC BLOOD PRESSURE: 41 MMHG | SYSTOLIC BLOOD PRESSURE: 84 MMHG

## 2017-09-16 VITALS — DIASTOLIC BLOOD PRESSURE: 50 MMHG | SYSTOLIC BLOOD PRESSURE: 102 MMHG

## 2017-09-16 VITALS — SYSTOLIC BLOOD PRESSURE: 105 MMHG | DIASTOLIC BLOOD PRESSURE: 59 MMHG

## 2017-09-16 VITALS — SYSTOLIC BLOOD PRESSURE: 126 MMHG | DIASTOLIC BLOOD PRESSURE: 49 MMHG

## 2017-09-16 VITALS — DIASTOLIC BLOOD PRESSURE: 49 MMHG | SYSTOLIC BLOOD PRESSURE: 108 MMHG

## 2017-09-16 VITALS — DIASTOLIC BLOOD PRESSURE: 50 MMHG | SYSTOLIC BLOOD PRESSURE: 127 MMHG

## 2017-09-16 VITALS — SYSTOLIC BLOOD PRESSURE: 132 MMHG | DIASTOLIC BLOOD PRESSURE: 52 MMHG

## 2017-09-16 VITALS — SYSTOLIC BLOOD PRESSURE: 98 MMHG | DIASTOLIC BLOOD PRESSURE: 52 MMHG

## 2017-09-16 VITALS — SYSTOLIC BLOOD PRESSURE: 109 MMHG | DIASTOLIC BLOOD PRESSURE: 53 MMHG

## 2017-09-16 VITALS — DIASTOLIC BLOOD PRESSURE: 51 MMHG | SYSTOLIC BLOOD PRESSURE: 114 MMHG

## 2017-09-16 VITALS — SYSTOLIC BLOOD PRESSURE: 100 MMHG | DIASTOLIC BLOOD PRESSURE: 47 MMHG

## 2017-09-16 VITALS — DIASTOLIC BLOOD PRESSURE: 57 MMHG | SYSTOLIC BLOOD PRESSURE: 117 MMHG

## 2017-09-16 VITALS — SYSTOLIC BLOOD PRESSURE: 105 MMHG | DIASTOLIC BLOOD PRESSURE: 45 MMHG

## 2017-09-16 VITALS — DIASTOLIC BLOOD PRESSURE: 51 MMHG | SYSTOLIC BLOOD PRESSURE: 105 MMHG

## 2017-09-16 VITALS — SYSTOLIC BLOOD PRESSURE: 117 MMHG | DIASTOLIC BLOOD PRESSURE: 49 MMHG

## 2017-09-16 VITALS — DIASTOLIC BLOOD PRESSURE: 48 MMHG | SYSTOLIC BLOOD PRESSURE: 102 MMHG

## 2017-09-16 VITALS — DIASTOLIC BLOOD PRESSURE: 48 MMHG | SYSTOLIC BLOOD PRESSURE: 121 MMHG

## 2017-09-16 VITALS — DIASTOLIC BLOOD PRESSURE: 45 MMHG | SYSTOLIC BLOOD PRESSURE: 102 MMHG

## 2017-09-16 VITALS — DIASTOLIC BLOOD PRESSURE: 58 MMHG | SYSTOLIC BLOOD PRESSURE: 135 MMHG

## 2017-09-16 VITALS — DIASTOLIC BLOOD PRESSURE: 54 MMHG | SYSTOLIC BLOOD PRESSURE: 127 MMHG

## 2017-09-16 VITALS — SYSTOLIC BLOOD PRESSURE: 118 MMHG | DIASTOLIC BLOOD PRESSURE: 55 MMHG

## 2017-09-16 VITALS — SYSTOLIC BLOOD PRESSURE: 119 MMHG | DIASTOLIC BLOOD PRESSURE: 51 MMHG

## 2017-09-16 VITALS — DIASTOLIC BLOOD PRESSURE: 44 MMHG | SYSTOLIC BLOOD PRESSURE: 110 MMHG

## 2017-09-16 VITALS — SYSTOLIC BLOOD PRESSURE: 114 MMHG | DIASTOLIC BLOOD PRESSURE: 47 MMHG

## 2017-09-16 VITALS — DIASTOLIC BLOOD PRESSURE: 50 MMHG | SYSTOLIC BLOOD PRESSURE: 116 MMHG

## 2017-09-16 VITALS — DIASTOLIC BLOOD PRESSURE: 48 MMHG | SYSTOLIC BLOOD PRESSURE: 96 MMHG

## 2017-09-16 VITALS — SYSTOLIC BLOOD PRESSURE: 107 MMHG | DIASTOLIC BLOOD PRESSURE: 44 MMHG

## 2017-09-16 VITALS — SYSTOLIC BLOOD PRESSURE: 115 MMHG | DIASTOLIC BLOOD PRESSURE: 52 MMHG

## 2017-09-16 VITALS — SYSTOLIC BLOOD PRESSURE: 135 MMHG | DIASTOLIC BLOOD PRESSURE: 49 MMHG

## 2017-09-16 VITALS — DIASTOLIC BLOOD PRESSURE: 50 MMHG | SYSTOLIC BLOOD PRESSURE: 93 MMHG

## 2017-09-16 VITALS — SYSTOLIC BLOOD PRESSURE: 105 MMHG | DIASTOLIC BLOOD PRESSURE: 48 MMHG

## 2017-09-16 VITALS — DIASTOLIC BLOOD PRESSURE: 51 MMHG | SYSTOLIC BLOOD PRESSURE: 127 MMHG

## 2017-09-16 VITALS — SYSTOLIC BLOOD PRESSURE: 124 MMHG | DIASTOLIC BLOOD PRESSURE: 48 MMHG

## 2017-09-16 VITALS — SYSTOLIC BLOOD PRESSURE: 121 MMHG | DIASTOLIC BLOOD PRESSURE: 50 MMHG

## 2017-09-16 VITALS — DIASTOLIC BLOOD PRESSURE: 39 MMHG | SYSTOLIC BLOOD PRESSURE: 94 MMHG

## 2017-09-16 VITALS — SYSTOLIC BLOOD PRESSURE: 127 MMHG | DIASTOLIC BLOOD PRESSURE: 50 MMHG

## 2017-09-16 VITALS — SYSTOLIC BLOOD PRESSURE: 117 MMHG | DIASTOLIC BLOOD PRESSURE: 54 MMHG

## 2017-09-16 VITALS — DIASTOLIC BLOOD PRESSURE: 50 MMHG | SYSTOLIC BLOOD PRESSURE: 109 MMHG

## 2017-09-16 VITALS — DIASTOLIC BLOOD PRESSURE: 53 MMHG | SYSTOLIC BLOOD PRESSURE: 108 MMHG

## 2017-09-16 VITALS — SYSTOLIC BLOOD PRESSURE: 109 MMHG | DIASTOLIC BLOOD PRESSURE: 45 MMHG

## 2017-09-16 VITALS — DIASTOLIC BLOOD PRESSURE: 49 MMHG | SYSTOLIC BLOOD PRESSURE: 130 MMHG

## 2017-09-16 LAB
ALBUMIN SERPL-MCNC: 4 G/DL (ref 3.4–5)
ALP SERPL-CCNC: 29 U/L (ref 46–116)
ALT SERPL-CCNC: 30 U/L (ref 10–68)
ANION GAP SERPL CALC-SCNC: 13.9 MMOL/L (ref 8–16)
BILIRUB SERPL-MCNC: 0.8 MG/DL (ref 0.2–1.3)
BUN SERPL-MCNC: 19 MG/DL (ref 7–18)
CALCIUM SERPL-MCNC: 8.8 MG/DL (ref 8.5–10.1)
CHLORIDE SERPL-SCNC: 107 MMOL/L (ref 98–107)
CO2 SERPL-SCNC: 28.5 MMOL/L (ref 21–32)
CREAT SERPL-MCNC: 0.9 MG/DL (ref 0.6–1.3)
ERYTHROCYTE [DISTWIDTH] IN BLOOD BY AUTOMATED COUNT: 15.9 % (ref 11.5–14.5)
GLOBULIN SER-MCNC: 1.7 G/L
GLUCOSE SERPL-MCNC: 133 MG/DL (ref 74–106)
HCT VFR BLD CALC: 28.8 % (ref 36–48)
HGB BLD-MCNC: 10 G/DL (ref 12–16)
MCH RBC QN AUTO: 32.5 PG (ref 26–34)
MCHC RBC AUTO-ENTMCNC: 34.7 G/DL (ref 31–37)
MCV RBC: 93.5 FL (ref 80–100)
OSMOLALITY SERPL CALC.SUM OF ELEC: 292 MOSM/KG (ref 275–300)
PLATELET # BLD EST: (no result) 10*3/UL
PLATELET # BLD: 89 10X3/UL (ref 130–400)
PMV BLD AUTO: 11.2 FL (ref 7.4–10.4)
POTASSIUM SERPL-SCNC: 4.4 MMOL/L (ref 3.5–5.1)
PROT SERPL-MCNC: 5.7 G/DL (ref 6.4–8.2)
RBC # BLD AUTO: 3.08 10X6/UL (ref 4–5.4)
SODIUM SERPL-SCNC: 145 MMOL/L (ref 136–145)
VANCOMYCIN TROUGH SERPL-MCNC: 11 UG/ML (ref 10–20)
WBC # BLD AUTO: 7.8 10X3/UL (ref 4.8–10.8)

## 2017-09-16 NOTE — NUR
12-LEAD EKG COMPLETED. IPPB TREATMENT STARTED ALSO. ABG'S ASSESSED. NOTHING TO
TREAT ON ABG, WILL ENCOURAGED DEEP BREATHS AGAIN. TAKING SIPS OF WATER ON HER
OWN. WILL MONITOR. 1:1 NURSING CARE IN PROGRESS.

## 2017-09-16 NOTE — NUR
DR. RYAN NOTIFIED AT 1547 OF CURRENT VITAL SIGNS, URINE OUTPUT AND PT
CONDITION.  NEW ORDERS RECEIVED.  DOPAMINE TURNED TO 5 MCG/KG/MIN AT 1550.  PT
INFORMED OF ORDERS FOR BIPAP AND EDUCATED ON BIPAP.  R.T. NOTIFIED.  AT
BEDSIDE SETTING UP FOR 18/8 CONTINUOUS AND WILL DC IPPB.

## 2017-09-16 NOTE — NUR
BECAME NAUSEATED. EMESIS BAG OBTAINED. IV ZOFRAN OBTAINED AND GIVEN. STATED
SHE "FEEL LIKE I'M GOING TO THROW UP". BELCHED THEN SAID "MAYBE THAT WAS ALL I
NEEDED TO DO". NO EMESIS. ZOFRAN GIVEN. DEEP BREATHING AND COUGHING EXERCISES
COMPLETED. WILL MONITOR.

## 2017-09-16 NOTE — NUR
ABG'S RESULTED. WILL CALL DR. RYAN WITH DECREASED URINARY OUTPUT, DECREASED
CARDIAC OUTPUT TO 3.5 AND O2 SATS 91-93% ON THE MONITOR AND DECREASED ON THE
ABG.

## 2017-09-16 NOTE — NUR
PO MEDS INCLUDING PRN TYLENOL GIVEN FOR ELEVATED TEMP GIVEN OFF BIPAP AND ON
5LPM VIA NC. BACTROBAN APPLIED TO BILATERAL NARES. TOLERATED WELL AND DROP IN
SVO2 WHILE OFF BIPAP TO 38. BACK TO BASELINE WHEN BACK ON BIPAP. TURNED AND
REPOSITIONED TO LEFT SIDE. WEAK COUGH EFFORTS NOTED OFF BIPAP. DID WANT A DOSE
OF PAIN MEDS AFTER TURNING. WILL MONITOR.

## 2017-09-16 NOTE — NUR
MORE FREQUENT PAC'S. THEN BEGAN V-PACING AT 60 AND B/P DECREASED TO 70'S
SYSTOLIC. TPM SET BACK TO DDD 90; 10; 10 AND B/P RETURNED TO BASELINE. ABG
OBTAINED. DR. RYAN CALLED AND INFORMED OF ABOVE EVENTS. NEW ORDERS RECEIVED.
WILL MONITOR.

## 2017-09-16 NOTE — NUR
SHIFT ASSESSMENT COMPLETED. SEE ASSESSMENT PER FLOWSHEET. LETHARGIC BUT
AWAKENS AND ANSWERS QUESTIONS ASKED. WEARING BIPAP MASK-18/8; 40% FIO2; RATE
OF 10. TPM SET AT VVI-60; VMA OF 10. PACER SENSING. NSR IN THE 90'S W/ AN
OCCASSIONAL PAC/PVC. WILL MONITOR. 1:1 NURSING CARE IN PROGRESS.

## 2017-09-16 NOTE — NUR
REASSESSMENT COMPLETED. SEE ASSESSMENT FLOWSHEET. AWAKENS TO TOUCH OR NOISE.
ANSWERS ORIENTATION QUESTIONS CORRECTLY. IV ABX HUNG AS A PRIMARY. NO ACUTE
DISTRESS NOTED. WILL MONITOR. 1:1 NURSING CARE IN PROGRESS.

## 2017-09-16 NOTE — NUR
PORTABLE CHEST XRAY COMPLETED. TOLERATED WELL. REASSESSMENT COMPLETED. SEE
ASSESSMENT FLOWSHEET. ENCOURAGED DEEP COUGH WITH SPLINTING. WEAK TO FAIR COUGH
NON-PRODUCTIVE NOTED.
0345: RT LEG COBAND DRSG REMOVED AND DRSG CHANGED PER ORDERS. RT LEG AND UNDER
ARMS AND BILATERAL ARMS WASHED WITH BATH CLOTHES. MARTÍNEZ NIKA CARE COMPLETED
PER STANDARD PROCEDURE FOR WIPES. DENIES TO BE TURNED AT THIS TIME. TOP SHEET
CHANGED OUT. WATER GIVEN PER REQUEST BUT ENCOURAGED TO TAKE SMALL SIPS AND NOT
SIP IF BEGINNING TO GET NAUSEATED. WILL MONITOR.

## 2017-09-17 VITALS — SYSTOLIC BLOOD PRESSURE: 118 MMHG | DIASTOLIC BLOOD PRESSURE: 51 MMHG

## 2017-09-17 VITALS — DIASTOLIC BLOOD PRESSURE: 55 MMHG | SYSTOLIC BLOOD PRESSURE: 127 MMHG

## 2017-09-17 VITALS — DIASTOLIC BLOOD PRESSURE: 51 MMHG | SYSTOLIC BLOOD PRESSURE: 104 MMHG

## 2017-09-17 VITALS — DIASTOLIC BLOOD PRESSURE: 56 MMHG | SYSTOLIC BLOOD PRESSURE: 118 MMHG

## 2017-09-17 VITALS — SYSTOLIC BLOOD PRESSURE: 121 MMHG | DIASTOLIC BLOOD PRESSURE: 47 MMHG

## 2017-09-17 VITALS — SYSTOLIC BLOOD PRESSURE: 137 MMHG | DIASTOLIC BLOOD PRESSURE: 52 MMHG

## 2017-09-17 VITALS — DIASTOLIC BLOOD PRESSURE: 52 MMHG | SYSTOLIC BLOOD PRESSURE: 118 MMHG

## 2017-09-17 VITALS — SYSTOLIC BLOOD PRESSURE: 114 MMHG | DIASTOLIC BLOOD PRESSURE: 55 MMHG

## 2017-09-17 VITALS — DIASTOLIC BLOOD PRESSURE: 49 MMHG | SYSTOLIC BLOOD PRESSURE: 122 MMHG

## 2017-09-17 VITALS — SYSTOLIC BLOOD PRESSURE: 127 MMHG | DIASTOLIC BLOOD PRESSURE: 54 MMHG

## 2017-09-17 VITALS — SYSTOLIC BLOOD PRESSURE: 120 MMHG | DIASTOLIC BLOOD PRESSURE: 47 MMHG

## 2017-09-17 VITALS — DIASTOLIC BLOOD PRESSURE: 52 MMHG | SYSTOLIC BLOOD PRESSURE: 120 MMHG

## 2017-09-17 VITALS — SYSTOLIC BLOOD PRESSURE: 119 MMHG | DIASTOLIC BLOOD PRESSURE: 53 MMHG

## 2017-09-17 VITALS — DIASTOLIC BLOOD PRESSURE: 50 MMHG | SYSTOLIC BLOOD PRESSURE: 128 MMHG

## 2017-09-17 VITALS — DIASTOLIC BLOOD PRESSURE: 55 MMHG | SYSTOLIC BLOOD PRESSURE: 123 MMHG

## 2017-09-17 VITALS — DIASTOLIC BLOOD PRESSURE: 57 MMHG | SYSTOLIC BLOOD PRESSURE: 133 MMHG

## 2017-09-17 VITALS — SYSTOLIC BLOOD PRESSURE: 132 MMHG | DIASTOLIC BLOOD PRESSURE: 51 MMHG

## 2017-09-17 VITALS — SYSTOLIC BLOOD PRESSURE: 134 MMHG | DIASTOLIC BLOOD PRESSURE: 59 MMHG

## 2017-09-17 VITALS — DIASTOLIC BLOOD PRESSURE: 56 MMHG | SYSTOLIC BLOOD PRESSURE: 132 MMHG

## 2017-09-17 VITALS — DIASTOLIC BLOOD PRESSURE: 49 MMHG | SYSTOLIC BLOOD PRESSURE: 125 MMHG

## 2017-09-17 VITALS — SYSTOLIC BLOOD PRESSURE: 129 MMHG | DIASTOLIC BLOOD PRESSURE: 54 MMHG

## 2017-09-17 VITALS — SYSTOLIC BLOOD PRESSURE: 122 MMHG | DIASTOLIC BLOOD PRESSURE: 48 MMHG

## 2017-09-17 VITALS — DIASTOLIC BLOOD PRESSURE: 59 MMHG | SYSTOLIC BLOOD PRESSURE: 139 MMHG

## 2017-09-17 VITALS — SYSTOLIC BLOOD PRESSURE: 133 MMHG | DIASTOLIC BLOOD PRESSURE: 49 MMHG

## 2017-09-17 VITALS — SYSTOLIC BLOOD PRESSURE: 156 MMHG | DIASTOLIC BLOOD PRESSURE: 67 MMHG

## 2017-09-17 VITALS — DIASTOLIC BLOOD PRESSURE: 56 MMHG | SYSTOLIC BLOOD PRESSURE: 135 MMHG

## 2017-09-17 VITALS — SYSTOLIC BLOOD PRESSURE: 127 MMHG | DIASTOLIC BLOOD PRESSURE: 50 MMHG

## 2017-09-17 VITALS — DIASTOLIC BLOOD PRESSURE: 52 MMHG | SYSTOLIC BLOOD PRESSURE: 134 MMHG

## 2017-09-17 VITALS — SYSTOLIC BLOOD PRESSURE: 135 MMHG | DIASTOLIC BLOOD PRESSURE: 62 MMHG

## 2017-09-17 VITALS — DIASTOLIC BLOOD PRESSURE: 58 MMHG | SYSTOLIC BLOOD PRESSURE: 136 MMHG

## 2017-09-17 VITALS — DIASTOLIC BLOOD PRESSURE: 56 MMHG | SYSTOLIC BLOOD PRESSURE: 128 MMHG

## 2017-09-17 VITALS — SYSTOLIC BLOOD PRESSURE: 137 MMHG | DIASTOLIC BLOOD PRESSURE: 57 MMHG

## 2017-09-17 VITALS — DIASTOLIC BLOOD PRESSURE: 70 MMHG | SYSTOLIC BLOOD PRESSURE: 155 MMHG

## 2017-09-17 VITALS — SYSTOLIC BLOOD PRESSURE: 128 MMHG | DIASTOLIC BLOOD PRESSURE: 68 MMHG

## 2017-09-17 VITALS — DIASTOLIC BLOOD PRESSURE: 46 MMHG | SYSTOLIC BLOOD PRESSURE: 106 MMHG

## 2017-09-17 VITALS — DIASTOLIC BLOOD PRESSURE: 53 MMHG | SYSTOLIC BLOOD PRESSURE: 120 MMHG

## 2017-09-17 VITALS — SYSTOLIC BLOOD PRESSURE: 139 MMHG | DIASTOLIC BLOOD PRESSURE: 53 MMHG

## 2017-09-17 VITALS — DIASTOLIC BLOOD PRESSURE: 58 MMHG | SYSTOLIC BLOOD PRESSURE: 135 MMHG

## 2017-09-17 VITALS — SYSTOLIC BLOOD PRESSURE: 130 MMHG | DIASTOLIC BLOOD PRESSURE: 55 MMHG

## 2017-09-17 VITALS — DIASTOLIC BLOOD PRESSURE: 70 MMHG | SYSTOLIC BLOOD PRESSURE: 138 MMHG

## 2017-09-17 VITALS — DIASTOLIC BLOOD PRESSURE: 60 MMHG | SYSTOLIC BLOOD PRESSURE: 142 MMHG

## 2017-09-17 VITALS — SYSTOLIC BLOOD PRESSURE: 143 MMHG | DIASTOLIC BLOOD PRESSURE: 59 MMHG

## 2017-09-17 VITALS — SYSTOLIC BLOOD PRESSURE: 136 MMHG | DIASTOLIC BLOOD PRESSURE: 63 MMHG

## 2017-09-17 VITALS — DIASTOLIC BLOOD PRESSURE: 58 MMHG | SYSTOLIC BLOOD PRESSURE: 126 MMHG

## 2017-09-17 VITALS — DIASTOLIC BLOOD PRESSURE: 59 MMHG | SYSTOLIC BLOOD PRESSURE: 135 MMHG

## 2017-09-17 VITALS — SYSTOLIC BLOOD PRESSURE: 155 MMHG | DIASTOLIC BLOOD PRESSURE: 70 MMHG

## 2017-09-17 VITALS — DIASTOLIC BLOOD PRESSURE: 59 MMHG | SYSTOLIC BLOOD PRESSURE: 141 MMHG

## 2017-09-17 VITALS — SYSTOLIC BLOOD PRESSURE: 128 MMHG | DIASTOLIC BLOOD PRESSURE: 58 MMHG

## 2017-09-17 VITALS — SYSTOLIC BLOOD PRESSURE: 96 MMHG | DIASTOLIC BLOOD PRESSURE: 45 MMHG

## 2017-09-17 VITALS — SYSTOLIC BLOOD PRESSURE: 130 MMHG | DIASTOLIC BLOOD PRESSURE: 59 MMHG

## 2017-09-17 VITALS — DIASTOLIC BLOOD PRESSURE: 52 MMHG | SYSTOLIC BLOOD PRESSURE: 121 MMHG

## 2017-09-17 VITALS — DIASTOLIC BLOOD PRESSURE: 55 MMHG | SYSTOLIC BLOOD PRESSURE: 126 MMHG

## 2017-09-17 VITALS — SYSTOLIC BLOOD PRESSURE: 128 MMHG | DIASTOLIC BLOOD PRESSURE: 57 MMHG

## 2017-09-17 VITALS — SYSTOLIC BLOOD PRESSURE: 115 MMHG | DIASTOLIC BLOOD PRESSURE: 47 MMHG

## 2017-09-17 VITALS — DIASTOLIC BLOOD PRESSURE: 45 MMHG | SYSTOLIC BLOOD PRESSURE: 110 MMHG

## 2017-09-17 VITALS — DIASTOLIC BLOOD PRESSURE: 55 MMHG | SYSTOLIC BLOOD PRESSURE: 130 MMHG

## 2017-09-17 VITALS — SYSTOLIC BLOOD PRESSURE: 146 MMHG | DIASTOLIC BLOOD PRESSURE: 62 MMHG

## 2017-09-17 VITALS — DIASTOLIC BLOOD PRESSURE: 54 MMHG | SYSTOLIC BLOOD PRESSURE: 120 MMHG

## 2017-09-17 VITALS — DIASTOLIC BLOOD PRESSURE: 58 MMHG | SYSTOLIC BLOOD PRESSURE: 130 MMHG

## 2017-09-17 VITALS — DIASTOLIC BLOOD PRESSURE: 55 MMHG | SYSTOLIC BLOOD PRESSURE: 128 MMHG

## 2017-09-17 VITALS — SYSTOLIC BLOOD PRESSURE: 158 MMHG | DIASTOLIC BLOOD PRESSURE: 71 MMHG

## 2017-09-17 VITALS — DIASTOLIC BLOOD PRESSURE: 53 MMHG | SYSTOLIC BLOOD PRESSURE: 117 MMHG

## 2017-09-17 VITALS — SYSTOLIC BLOOD PRESSURE: 125 MMHG | DIASTOLIC BLOOD PRESSURE: 55 MMHG

## 2017-09-17 VITALS — DIASTOLIC BLOOD PRESSURE: 54 MMHG | SYSTOLIC BLOOD PRESSURE: 134 MMHG

## 2017-09-17 VITALS — DIASTOLIC BLOOD PRESSURE: 52 MMHG | SYSTOLIC BLOOD PRESSURE: 140 MMHG

## 2017-09-17 LAB
ALBUMIN SERPL-MCNC: 3.1 G/DL (ref 3.4–5)
ALP SERPL-CCNC: 42 U/L (ref 46–116)
ALT SERPL-CCNC: 47 U/L (ref 10–68)
ANION GAP SERPL CALC-SCNC: 13 MMOL/L (ref 8–16)
BILIRUB SERPL-MCNC: 0.8 MG/DL (ref 0.2–1.3)
BUN SERPL-MCNC: 30 MG/DL (ref 7–18)
CALCIUM SERPL-MCNC: 8.6 MG/DL (ref 8.5–10.1)
CHLORIDE SERPL-SCNC: 104 MMOL/L (ref 98–107)
CO2 SERPL-SCNC: 26.8 MMOL/L (ref 21–32)
CREAT SERPL-MCNC: 1.3 MG/DL (ref 0.6–1.3)
ERYTHROCYTE [DISTWIDTH] IN BLOOD BY AUTOMATED COUNT: 16.2 % (ref 11.5–14.5)
GLOBULIN SER-MCNC: 2.6 G/L
GLUCOSE SERPL-MCNC: 126 MG/DL (ref 74–106)
HCT VFR BLD CALC: 27.4 % (ref 36–48)
HGB BLD-MCNC: 9.3 G/DL (ref 12–16)
MCH RBC QN AUTO: 32.3 PG (ref 26–34)
MCHC RBC AUTO-ENTMCNC: 33.9 G/DL (ref 31–37)
MCV RBC: 95.1 FL (ref 80–100)
OSMOLALITY SERPL CALC.SUM OF ELEC: 285 MOSM/KG (ref 275–300)
PLATELET # BLD: 79 10X3/UL (ref 130–400)
PMV BLD AUTO: 11.1 FL (ref 7.4–10.4)
POTASSIUM SERPL-SCNC: 4.8 MMOL/L (ref 3.5–5.1)
PROT SERPL-MCNC: 5.7 G/DL (ref 6.4–8.2)
RBC # BLD AUTO: 2.88 10X6/UL (ref 4–5.4)
SODIUM SERPL-SCNC: 139 MMOL/L (ref 136–145)
WBC # BLD AUTO: 8.1 10X3/UL (ref 4.8–10.8)

## 2017-09-17 NOTE — NUR
SINCE BEING TURNED TO RT SIDE PA PRESSURE AND B/P SLOWLY CLIMBED. TRANSDUCER
AT PHLEBOSTATIC AXIS. NITRO GTT STARTED @ 3ML/HR. WILL MONITOR. 1:1 NURSING
CARE IN PROGRESS.

## 2017-09-17 NOTE — NUR
ASSISTED PT TO BSC, BIPAP REMAINS IN PLACE, VOIDED 600 CC OF URINE,  BACK TO
BED, POSITIONED UP AND SUPPORTED WITH PILLOWS, VSS, REMAINS A-V PACED ON
MONITOR.

## 2017-09-17 NOTE — HP
PATIENT: MAYRA MARTINEZ                               MEDICAL RECORD: [id number]
ACCOUNT: [id number]                                    LOCATION:[facility]05
: 49                                            ADMISSION DATE: 09/15/17
                                                         
 
                             HISTORY AND PHYSICAL EXAMINATION
 
 
NameMAYRA MARTINEZ (73yo, F) ID# [id number]. Date/Time2017
01:[id number] Dept.[facility]
ClinicProviderEDROSEMARY RYAN MDInsuranceMed Primary: MEDICARE-AR (MEDICARE)
Insurance # : [id number]
Referring Provider Name : ASHLY MAZARIEGOS
Employer Name : RETIRED
Med Secondary: [employer] (MEDICARE SUPPLEMENT)
Insurance # : [id number]
Employer Name : RETIRED
Med : [employer] (MEDICARE SUPPLEMENT)
Insurance # : [id number]
Employer Name : RETIRED
Prescription: CMX - Member is eligible. Chief Complaint
valvular heart disease, Coronary artery disease, Carotid stenosis
Patient's Care Team
Referring Provider ():  ASHLY MAZARIEGOS: [address] [place] [address], Ph [phone], Fax [phone] 
Cardiologist:  NENO AGUILERA MD: [address] [place] [id number], Ph [phone], Fax [phone] NPI: [id number]
Patient's Pharmacies
[facility] (MAIL-ORDER, ERX): [facility]
[address], Ph [phone], Fax [phone]
Vitals
BP:170/8 0 sitting R arm 2017 01:42 pm
160/70 sitting L arm 2017 01:43 pmHR:60R/R 2017 01:43 pmHt:5 ft 4 in
2017 01:42 pmWt:174 lbs 2017 01:40 pmNotes:murmur 2017 01:43
pmBMI:29.9 2017 01:42 pmAllergies
Reviewed Allergies
ACE INHIBITORSMedications
Reviewed Medications
Aspir-Low17   enteredPending sale to [facility] WilsonBystolic 10 mg icrpmv58/04/17  
filledCaremarkcalcium carbonate 1,177 mg chewable tablet
Take by oral route.17   [facility] Wilsoncholecalciferol (vit D3) 1,000
unit-vitamin K2 (MK4) 100 mcg tablet
Take by oral route.17   enteredPending sale to [facility] Wilsoncitalopram 20 mg frafvx15/04/17  
filledCaremarkclopidogrel 75 mg eixwoh52/09/17   filledCaremarkfamotidine 20 mg
tablet
Take 1 tablet(s) twice a day by oral route.17   [facility] Center[facility]ill.acidophilus (bulk)17   [facility] Wilsonlevothyroxine 25
mcg wczthz80/06/17   filledCaremarkLyrica 75 mg nwvcftj63/30/17  
filledCaremarkNitrostat 0.4 mg sublingual mxaiet34/01/11   filledMEDCOpravastatin 20
mg ndsiny14/30/17   filledCaremarksucralfate 1 gram kwowmi08/04/17  
filledCaremarkvalsartan 320 mg bjrzhl34/03/17   filledCaremarkVaccines
Reviewed Vaccines
 
Vaccine TypeDateAmt.RouteSiteLot #Mfr.Exp.
DateDate
on VISVIS
GivenVaccinatorInfluenzainfluenza, seasonal, slsriobakm78/30/11[facility]
ZfqgwmsuhjYyaveapkcwlsunkjmcqojbmh92/30/11[facility] HealthmartProblems
 
 
 
HISTORY AND PHYSICAL                           [id number]    MAYRA MARTINEZ       
 
 
Reviewed Problems
 
Carotid artery stenosis - Onset: 2017
Tricuspid valve regurgitation - Onset: 2017
Mitral valve regurgitation - Onset: 2017
Aortic valve stenosis - Onset: 2017
Abnormal glucose level
Acute renal failure syndrome
Anxiety state
Osteoarthritis of knee
Pure hypercholesterolemia
Solitary nodule of lung - Onset: 2017
Coronary atherosclerosis
Hyperlipidemia
Acute myocardial infarction
Acute stress disorder
Disorder of tendon
Benign hypertensive heart disease without congestive heart failure
Benign neoplasm of colon
Family History
Discussed Family History
 
Unspecified Relation- Myocardial infarction
- previously recorded as Heart Attack (MI)Unspecified Relation- Malignant neoplastic
disease
- previously recorded as Cancer, otherUnspecified Relation- Hypertensive disorder
- previously recorded as HypertensionSocial History
Discussed Social History
General
Marital status: 
General stress level: High
Smoking Status: Former smoker (Notes: quit >10yrs)
Alcohol intake: Occasional
is primary care giver to  who requires home care after cva 24-7
Surgical History
Reviewed Surgical History
 
GYN History
(not configured)
Past Medical History
Discussed Past Medical History
Cancer: Y
High Blood Pressure: Y
Thyroid Problems: Y
Notes: hyperlipidemia, coronary artery disease, hx of angioplasty, stenting ,
skin cancer L eyelid,
Documents for Discussion
N/A
Screening
None recorded.
HPI
Cerebral Vascular Disease
Reported by patient.
Quality: weakness; numbess; dizziness
Alleviating Factors: position change
 
 
 
HISTORY AND PHYSICAL                           [id number]    MAYRA MARTINEZ       
 
 
 
Coronary Artery Disease F/U
Reported by patient.
Severity: chest discomfort with household activities/yard work; has used
Nitroglycerin
Associated Symptoms: chest pain with exertion; dyspnea with exertion
ROS
Patient reports exercise intolerance  but reports no fever, no night sweats, no
significant weight gain, and no significant weight loss; easy fatigability. She
reports shortness of breath when walking  but reports no chest pain, no arm pain on
exertion, no shortness of breath when lying down, no palpitations, and no known
heart murmur. She reports shortness of breath but reports no cough, no wheezing, and
no coughing up blood. She reports dizziness  but reports no loss of consciousness,
no weakness, no numbness, no seizures, and no headaches. She reports no dry eyes, no
irritation, and no vision change. She reports no difficulty hearing and no ear pain.
She reports no frequent noseb karl and no nose/sinus problems. She reports no sore
throat, no bleeding gums, no snoring, no dry mouth, no mouth ulcers, no oral
abnormalities, and no teeth problems. She reports no jugular vein distension and no
swollen glands. She reports no abdominal  pain, no vomiting, normal appetite, no
diarrhea, not vomiting blood, no nausea, and no constipation. She reports no
incontinence, no difficulty urinating, no hematuria, and no increased frequency. She
reports no muscle aches, no muscle weakness, no arthra l gias/joint pain, no back
pain, and no swelling in the extremities. She reports no abnormal mole, no jaundice,
and no rashes. She reports no depression, no sleep disturbances, feeling safe in
relationship, and no alcohol abuse. She reports no fatigue. She reports no swollen
glands and no bruising. She reports no runny nose, no sinus pressure, no itching, no
hives, and no frequent sneezing. 
ROS as noted in the HPI
Physical Exam
Patient is a 72-year-old female.
 
Constitutional: General Appearance healthy-appearing, well developed, and
overweight. Level of Distress NAD. Ambulation ambulating normally.
 
Cardiovascular:  Apical Impulse not displaced or no thrill. Heart Auscultation
normal s1 and s2; no murmurs, rubs, or gallops; and RRR. Arterial Pulses no abdomin
al aorta bruits, femoral bruits, or popliteal bruits and 2+ bilateral, carotid 2+
bilateral, femoral 2+ bilateral, popliteal 2+ bilateral, and dorsalis pedis 2+
bilateral. Edema no edema or varicosities.
 
Lungs: Repiratory Effort no dyspnea. Percussion no hyperresonance or dullness or
flatness. Auscultation no wheezing, rhonchi, or rales / crackles and breathing
sounds normal, good air movement, and CTA except as noted.
 
Abdomen: Bowl Sounds normal. Inspection and Palpation no tenderness, guarding,
masses, or rebound tenderness and soft and non-distended. Liver non-tender and no
hepatomegaly. Spleen non-tender and no splenomegaly. Hernia none palpable.
 
Musculoskeletal System: Gait And Stance normal gait and stance. Digits and Nails
normal nails and no cyanosis.
 
Neurologic: Cranial Nerves grossly intact. Reflexes DTRs 2+ bilaterally throughout.
Sensation grossly intact.
 
Lymph Nodes: Lymph Nodes no cervical LAD, supraclavicular LAD, axillary LAD, or
 
 
 
HISTORY AND PHYSICAL                           [id number]    BREE MARTINEZIA J       
 
 
inguinal LAD.
 
Eyes: Lids and Conjunctivae no discharge or pallor and non-injected. Pupils PERRLA.
Cornea grossly intact. EOM EOMI. Lens clear. Sclera non-icteric.
 
Neck: Neck no masses or enlarged lymph nodes and supple, trachea midline, and
carotid bruits (bilateral). Thyroid no enlargement or nodules and non-tender.
 
Skin: Inspection and Palpation no rash, lesions, ulcers, jaundice, or abnormal nevi.
Assessment / Plan
severe occlusive coronary artery disease
Mild carotid artery disease
Left solitary pulmonary nodule
mild aortic stenosis
 
1. Coronary atherosclerosis
I25.10: Atherosclerotic heart disease of native coronary artery without angina
pectoris
 
2. Aortic valve stenosis
I35.0: Nonrheumatic aortic (valve) stenosis
AORTIC VALVE STENOSIS: CARE INSTRUCTIONS
 
3. Tricuspid valve regurgitation
I07.1: Rheumatic tricuspid insufficiency
 
4. Mitral valve regurgitation
I34.0: Nonrheumatic mitral (valve) insufficiency
HEART VALVE DISEASE: CARE INSTRUCTIONS
MITRAL VALVE REGURGITATION: CARE INSTRUCTIONS
 
5. Carotid artery stenosis
I65.29: Occlusion and stenosis of unspecified carotid artery
CAROTID STENOSIS: CARE INSTRUCTIONS
 
6. Solitary nodule of lung
R91.1: Solitary pulmonary nodule
 
Discussion Notes
patient would benefit from coronary artery bypass to the left anterior descending
and first diagonal obtuse marginal and right coronary
We will workup her solitary pulmonary nodule later
I have discussed her disease process with her in detail as well as the alternative
methods of treatment. We discussed génesis nary artery bypass including the expected
benefits and risks which include bleeding, infection, stroke, death, and the
imponderables. She understands all the above and wishes to proceed with planned
procedure. Scheduled carotid Doppler abdominal and disco ntinue Plavix.
schedule for coronary artery bypass
 
 
 
 
 
HISTORY AND PHYSICAL                           [id number]    MAYRA MARTINEZ EDWARD MD             
 
 
 
Electronically Signed by ERLIN RYAN on 17 at 1405
 
 
 
 
 
 
 
 
 
 
 
 
 
 
 
 
 
 
 
 
 
 
 
 
 
 
 
 
 
 
 
 
 
 
 
 
 
 
 
 
 
CC:                                                             [id number]
DICTATION DATE: 17 1315     : MAHAD  17 1340      ADM IN  
                                                                              
[facility]                                          
[address] [id number]

## 2017-09-17 NOTE — NUR
RECEIVED CARE OF PT, ASSESSMENT PER FLOWSHEET. PT RESTING IN BED WITH BIPAP
40% FIO2
IN
PLACE, AROUSES EASILY TO VOICE, HR PACED ON MONITOR AT 90, RHONCHI AUSCULTATED
WITH DIM BASES, STERNAL DRESSINGS CDI, PACER WIRES SECURED, RT LEG DRESSINGS
CDI, PPP, PT POSITIONED FOR COMFORT, ALL NEEDS DENIED, WILL MONITOR.

## 2017-09-17 NOTE — NUR
PT DAUGHTER, VICENTE CALLED-PASSWORD PROVIDED AND UPDATE GIVEN. PT NOTIFIED PER
FAMILY MEMBER REQUEST.

## 2017-09-17 NOTE — NUR
RT LEG DRSG CHANGED PER PROTOCOL/ORDERS. INCISIONS X5 TO RT LEG WNL. STAPLES
INTACT. TOLERATED WELL. MARTÍNEZ PERINEAL CARE COMPLETED. WILL MONITOR.

## 2017-09-17 NOTE — OP
PATIENT NAME:  MAYRA MARTINEZ                         MEDICAL RECORD: [id number]
:49                                             LOCATION:[facility]    D.CV05
                                                         ADMISSION DATE:09/15/17
SURGEON:  MIKEY CRAFT MD             
 
 
DATE OF OPERATION:  09/15/2017
 
SURGEON:  Mikey Craft MD.
 
ANESTHESIA:  General endotracheal, Dr. Washington.
 
OPERATION PERFORMED:  Coronary artery bypass utilizing left internal thoracic,
left anterior descending, reverse saphenous vein segment to the obtuse marginal
coronary artery and reverse saphenous vein graft to the distal right coronary
artery.
 
PREOPERATIVE DIAGNOSES:  Angina pectoris and severe occlusive coronary artery
disease.
 
POSTOPERATIVE DIAGNOSES:  Angina pectoris and severe occlusive coronary artery
disease.
 
INDICATION FOR OPERATION:  Angina pectoris.
 
FINDINGS OF THE OPERATION:  The left internal thoracic and greater saphenous
vein grafts were of excellent quality.  The target vessels, LAD, OM and right
were of good caliber and quality for grafting.  The first diagonal was severely
and diffusely diseased, too small and not a graftable vessel.
 
ESTIMATED BLOOD LOSS:  Cell Saver was used.
 
DESCRIPTION OF PROCEDURE:  After informed consent, adequate preoperative
medication and evaluation, the patient was brought to the operating room and
placed on the table in the supine position.  After induction of general
endotracheal anesthesia and application of appropriate monitoring devices, the
chest, neck, abdomen, and both legs were prepped and draped in a sterile field,
utilizing Betadine scrub, alcohol, and Betadine solution.  A
Betadine-impregnated drape was also used.  Saphenous vein was harvested from the
right thigh and prepared for reverse saphenous vein grafting.  The leg was
closed over drains utilizing 3-0 Vicryl and skin staples.  A median sternotomy
incision was used and dissection carried down the fascia.  Hemostasis was
maintained with electrocautery.  Sternum was divided.  Innominate vein was
identified and protected.  The left internal thoracic was taken down and
prepared for grafting.  The patient was given a calculated dose of heparin,
cannulated in a standard fashion utilizing 1 aortic, 1 two-stage cannula in the
atrium and inferior vena cava.  The patient was placed on cardiopulmonary
bypass, cooled to 32 degrees centigrade.  A cross clamp was placed just proximal
to the aortic cannula and the patient was given cardioplegic solution through
the aortic root.  The patient was given a warm induction and cold maintenance. 
The patient was given cold intermittent cardioplegic solution throughout the
procedure through the grafts, through the root or a combination of both.  The
first vessel to be grafted was the distal right.  It was grafted end-to-side
utilizing a running 7-0 Prolene suture.  The grafts were measured back to the
aorta and a proximal anastomosis fashioned utilizing running 6-0 Prolene suture.
 Next, the obtuse marginal was grafted end-to-side utilizing a running 7-0
Prolene suture.  Graft was measured back to the aorta and a proximal anastomosis
fashioned utilizing running 6-0 Prolene suture.  Next, left internal thoracic
 
 
 
OPERATIVE REPORT                               [id number]    MAYRA MARTINEZ       
 
 
was brought through the hole in pericardium, sutured left anterior descending
end-to-side utilizing a running 8-0 Prolene suture.  Pedicle was attached to
epicardium with 6-0 Prolene suture.  All maneuvers to remove trapped air were
performed.  The patient was given warm cardioplegic reperfusion and controlled
reperfusion.  The patient was rewarmed to 37 degrees centigrade.  Two atrial and
2 ventricular placing wires were placed in the heart and brought out through the
epigastric area.  The patient was weaned cardiopulmonary bypass.  After being
stable off bypass, he was given a calculated dose of protamine to reverse the
heparin.  Hemostasis was achieved.  A #40 right angle and #36 chest tubes were
brought in through the epigastric area and placed in mediastinum.  A separate
left pleural tube was connected to underwater seal and suction.  Chest was again
irrigated.  Instrument count and sponge count were correct times 2.  Chest was
closed in layers utilizing #7 wire on the sternum, #2 Vicryl in linea alba and
pectoralis fascia.  Subcutaneous tissue was approximated with 3-0 Vicryl and
skin approximated with 3-0 subcuticular Vicryl.  Sterile dressings were applied.
 The patient tolerated the procedure well and was transferred to cardiovascular
recovery in stable condition.
 
TRANSINT:[id number] Voice Confirmation ID: [id number] DOCUMENT ID: [id number]
                                           
                                           MIKEY CRAFT MD             
 
 
 
Electronically Signed by MIKEY CRAFT on 17 at 1405
 
 
 
 
 
 
 
 
 
 
 
 
 
 
 
 
 
 
 
 
 
 
CC:                                                             [id number]
DICTATION DATE: 09/15/17 1500     :     09/15/17 1958      ADM IN  
                                                                              
[facility]                                          
[address] [place] [address]

## 2017-09-17 NOTE — NUR
B/P DROPPED TO 80'S SYSTOLIC AND O2 SATS TO 83% ON BIPAP AT 40%. ABG'S
OBTAINED. KCL AND CALCIUM CHLORIDE STARTED. FIO2 ON BIPAP INCREASED TO 50% PER
ANJUM WITH R.T. FOR NOW. O2 SATS CLIMBED TO 97% WITHIN A FEW MINUTES AFTER
CHANGING FINGERS. B/P RETURNED TO BASELINE. WILL MONITOR CLOSELY.

## 2017-09-17 NOTE — NUR
NITRO GTT D/C'ED. PRESSURE 100'S SYSTOLIC. AM LABS DRAWN FROM RT RADIAL A-LINE
WITHOUT DIFFICULTY. SWEATING NOTED AROUND HEAD AND NECK. BATH CLOTHES USED TO
CLEAN AREA. PILLOW CASE CHANGED OUT. ASSISTED WITH WASHING HERSELF. ASKED IF
MAMTA WAS COMING. INFORMED HER I HAD TOLD HER ABOUT 5 MINUTES PRIOR MAMTA HAD
RECENTLY CALLED AND WOULD BE HERE AT 9 AND IT IS 6AM NOW. VERBALIZED
UNDERSTANDING. WILL MONITOR.

## 2017-09-17 NOTE — NUR
LINES DC'D PER ORDER.  TIPS INTACT.  MARTÍNEZ CATH DC'D PER ORDER.  TPM DRESSING
CHANGED.  PT BATHED.  AWAITING P.T. FOR EVAL.  BETH DRAINS DC'D PER ORDER.
LINENS CHANGED.

## 2017-09-17 NOTE — NUR
EPISODE OF BLOOD PRESSURE DROPPING INTO THE MID 80'S SYSTOLIC.  O2 SAT OF 82
ON MONITOR AND SVO2 OF 39 AT 1057.  STAT ABG DRAWN RESULTS SHOW NO CORRECTIVE
ACTION NEEDED.  BP UP /53, O2 SAT OF 95 ON THE MONITOR AND SVO2 OF 52 AT
1103.

## 2017-09-18 VITALS — SYSTOLIC BLOOD PRESSURE: 129 MMHG | DIASTOLIC BLOOD PRESSURE: 59 MMHG

## 2017-09-18 VITALS — SYSTOLIC BLOOD PRESSURE: 169 MMHG | DIASTOLIC BLOOD PRESSURE: 76 MMHG

## 2017-09-18 VITALS — DIASTOLIC BLOOD PRESSURE: 57 MMHG | SYSTOLIC BLOOD PRESSURE: 114 MMHG

## 2017-09-18 VITALS — DIASTOLIC BLOOD PRESSURE: 59 MMHG | SYSTOLIC BLOOD PRESSURE: 135 MMHG

## 2017-09-18 VITALS — SYSTOLIC BLOOD PRESSURE: 120 MMHG | DIASTOLIC BLOOD PRESSURE: 51 MMHG

## 2017-09-18 VITALS — DIASTOLIC BLOOD PRESSURE: 64 MMHG | SYSTOLIC BLOOD PRESSURE: 135 MMHG

## 2017-09-18 VITALS — SYSTOLIC BLOOD PRESSURE: 148 MMHG | DIASTOLIC BLOOD PRESSURE: 76 MMHG

## 2017-09-18 VITALS — SYSTOLIC BLOOD PRESSURE: 138 MMHG | DIASTOLIC BLOOD PRESSURE: 69 MMHG

## 2017-09-18 VITALS — SYSTOLIC BLOOD PRESSURE: 125 MMHG | DIASTOLIC BLOOD PRESSURE: 60 MMHG

## 2017-09-18 VITALS — SYSTOLIC BLOOD PRESSURE: 105 MMHG | DIASTOLIC BLOOD PRESSURE: 59 MMHG

## 2017-09-18 VITALS — SYSTOLIC BLOOD PRESSURE: 135 MMHG | DIASTOLIC BLOOD PRESSURE: 71 MMHG

## 2017-09-18 VITALS — SYSTOLIC BLOOD PRESSURE: 143 MMHG | DIASTOLIC BLOOD PRESSURE: 68 MMHG

## 2017-09-18 VITALS — DIASTOLIC BLOOD PRESSURE: 50 MMHG | SYSTOLIC BLOOD PRESSURE: 115 MMHG

## 2017-09-18 VITALS — DIASTOLIC BLOOD PRESSURE: 64 MMHG | SYSTOLIC BLOOD PRESSURE: 115 MMHG

## 2017-09-18 VITALS — SYSTOLIC BLOOD PRESSURE: 175 MMHG | DIASTOLIC BLOOD PRESSURE: 76 MMHG

## 2017-09-18 VITALS — SYSTOLIC BLOOD PRESSURE: 119 MMHG | DIASTOLIC BLOOD PRESSURE: 52 MMHG

## 2017-09-18 VITALS — SYSTOLIC BLOOD PRESSURE: 135 MMHG | DIASTOLIC BLOOD PRESSURE: 63 MMHG

## 2017-09-18 VITALS — DIASTOLIC BLOOD PRESSURE: 57 MMHG | SYSTOLIC BLOOD PRESSURE: 123 MMHG

## 2017-09-18 VITALS — SYSTOLIC BLOOD PRESSURE: 172 MMHG | DIASTOLIC BLOOD PRESSURE: 76 MMHG

## 2017-09-18 VITALS — DIASTOLIC BLOOD PRESSURE: 67 MMHG | SYSTOLIC BLOOD PRESSURE: 131 MMHG

## 2017-09-18 VITALS — SYSTOLIC BLOOD PRESSURE: 125 MMHG | DIASTOLIC BLOOD PRESSURE: 62 MMHG

## 2017-09-18 VITALS — SYSTOLIC BLOOD PRESSURE: 135 MMHG | DIASTOLIC BLOOD PRESSURE: 68 MMHG

## 2017-09-18 VITALS — SYSTOLIC BLOOD PRESSURE: 124 MMHG | DIASTOLIC BLOOD PRESSURE: 59 MMHG

## 2017-09-18 VITALS — DIASTOLIC BLOOD PRESSURE: 58 MMHG | SYSTOLIC BLOOD PRESSURE: 130 MMHG

## 2017-09-18 VITALS — SYSTOLIC BLOOD PRESSURE: 136 MMHG | DIASTOLIC BLOOD PRESSURE: 85 MMHG

## 2017-09-18 VITALS — DIASTOLIC BLOOD PRESSURE: 57 MMHG | SYSTOLIC BLOOD PRESSURE: 119 MMHG

## 2017-09-18 VITALS — SYSTOLIC BLOOD PRESSURE: 127 MMHG | DIASTOLIC BLOOD PRESSURE: 57 MMHG

## 2017-09-18 VITALS — SYSTOLIC BLOOD PRESSURE: 131 MMHG | DIASTOLIC BLOOD PRESSURE: 66 MMHG

## 2017-09-18 VITALS — SYSTOLIC BLOOD PRESSURE: 115 MMHG | DIASTOLIC BLOOD PRESSURE: 64 MMHG

## 2017-09-18 VITALS — DIASTOLIC BLOOD PRESSURE: 56 MMHG | SYSTOLIC BLOOD PRESSURE: 128 MMHG

## 2017-09-18 VITALS — DIASTOLIC BLOOD PRESSURE: 57 MMHG | SYSTOLIC BLOOD PRESSURE: 121 MMHG

## 2017-09-18 VITALS — SYSTOLIC BLOOD PRESSURE: 115 MMHG | DIASTOLIC BLOOD PRESSURE: 58 MMHG

## 2017-09-18 VITALS — SYSTOLIC BLOOD PRESSURE: 128 MMHG | DIASTOLIC BLOOD PRESSURE: 63 MMHG

## 2017-09-18 VITALS — SYSTOLIC BLOOD PRESSURE: 127 MMHG | DIASTOLIC BLOOD PRESSURE: 58 MMHG

## 2017-09-18 VITALS — SYSTOLIC BLOOD PRESSURE: 127 MMHG | DIASTOLIC BLOOD PRESSURE: 54 MMHG

## 2017-09-18 VITALS — SYSTOLIC BLOOD PRESSURE: 139 MMHG | DIASTOLIC BLOOD PRESSURE: 74 MMHG

## 2017-09-18 VITALS — SYSTOLIC BLOOD PRESSURE: 115 MMHG | DIASTOLIC BLOOD PRESSURE: 51 MMHG

## 2017-09-18 VITALS — DIASTOLIC BLOOD PRESSURE: 58 MMHG | SYSTOLIC BLOOD PRESSURE: 118 MMHG

## 2017-09-18 VITALS — DIASTOLIC BLOOD PRESSURE: 58 MMHG | SYSTOLIC BLOOD PRESSURE: 125 MMHG

## 2017-09-18 VITALS — SYSTOLIC BLOOD PRESSURE: 117 MMHG | DIASTOLIC BLOOD PRESSURE: 93 MMHG

## 2017-09-18 VITALS — SYSTOLIC BLOOD PRESSURE: 122 MMHG | DIASTOLIC BLOOD PRESSURE: 67 MMHG

## 2017-09-18 VITALS — SYSTOLIC BLOOD PRESSURE: 144 MMHG | DIASTOLIC BLOOD PRESSURE: 69 MMHG

## 2017-09-18 VITALS — SYSTOLIC BLOOD PRESSURE: 107 MMHG | DIASTOLIC BLOOD PRESSURE: 54 MMHG

## 2017-09-18 VITALS — DIASTOLIC BLOOD PRESSURE: 53 MMHG | SYSTOLIC BLOOD PRESSURE: 148 MMHG

## 2017-09-18 VITALS — SYSTOLIC BLOOD PRESSURE: 122 MMHG | DIASTOLIC BLOOD PRESSURE: 61 MMHG

## 2017-09-18 LAB
ALBUMIN SERPL-MCNC: 2.9 G/DL (ref 3.4–5)
ALP SERPL-CCNC: 55 U/L (ref 46–116)
ALT SERPL-CCNC: 49 U/L (ref 10–68)
ANION GAP SERPL CALC-SCNC: 7.5 MMOL/L (ref 8–16)
BILIRUB SERPL-MCNC: 0.7 MG/DL (ref 0.2–1.3)
BUN SERPL-MCNC: 26 MG/DL (ref 7–18)
CALCIUM SERPL-MCNC: 8.7 MG/DL (ref 8.5–10.1)
CHLORIDE SERPL-SCNC: 106 MMOL/L (ref 98–107)
CO2 SERPL-SCNC: 29.9 MMOL/L (ref 21–32)
CREAT SERPL-MCNC: 1.2 MG/DL (ref 0.6–1.3)
ERYTHROCYTE [DISTWIDTH] IN BLOOD BY AUTOMATED COUNT: 15.5 % (ref 11.5–14.5)
GLOBULIN SER-MCNC: 2.8 G/L
GLUCOSE SERPL-MCNC: 115 MG/DL (ref 74–106)
HCT VFR BLD CALC: 26.3 % (ref 36–48)
HGB BLD-MCNC: 8.8 G/DL (ref 12–16)
MCH RBC QN AUTO: 32.1 PG (ref 26–34)
MCHC RBC AUTO-ENTMCNC: 33.5 G/DL (ref 31–37)
MCV RBC: 96 FL (ref 80–100)
OSMOLALITY SERPL CALC.SUM OF ELEC: 283 MOSM/KG (ref 275–300)
PLATELET # BLD: 76 10X3/UL (ref 130–400)
PMV BLD AUTO: 10.8 FL (ref 7.4–10.4)
POTASSIUM SERPL-SCNC: 4.4 MMOL/L (ref 3.5–5.1)
PROT SERPL-MCNC: 5.7 G/DL (ref 6.4–8.2)
RBC # BLD AUTO: 2.74 10X6/UL (ref 4–5.4)
SODIUM SERPL-SCNC: 139 MMOL/L (ref 136–145)
WBC # BLD AUTO: 6.3 10X3/UL (ref 4.8–10.8)

## 2017-09-18 NOTE — NUR
REASSESSMENT PER FLOWSHEET, NO ACUTE CHANGES NOTED. REMAINS A-V PACED AT 90 ON
CM, POSITIONED FOR COMFORT.

## 2017-09-18 NOTE — NUR
Nutrition Follow Up:
Pt's diet has been advanced to Clear Liquids. Wt gain noted. No BM since
admit. Labs reviewed. Meds noted including Lasix.
Rec advancing RAFAT when medically feasible.
RD following.

## 2017-09-18 NOTE — NUR
UP TO BSC WITH MINIMAL ASSIST, NIKA-CARE PER PT AND BACK TO BED.  DSGS CHANGED
PER MD ORDERS BY GALEN ALMONTE.. NITRO GTT OFF.  ALARMS ON.

## 2017-09-18 NOTE — NUR
REASSESSMENT PER FLOWSHEET, NO ACUTE CHANGES. PT UP TO BSC, VOIDED 500ML
CLEAR, YELLOW URINE.  NIKA-CARE PER PT AND BACK TO BED.  GIVEN CRANBERRY JUICE
PER REQUEST.  ALARMS ON AND C/L IN USE.

## 2017-09-18 NOTE — NUR
PT HAS WALKED WITH PHYSICAL THERAPY. RETURNED TO ROOM AND PLACED ON BEDSIDE
TOILET. HAS HAD 600ML CLEAR YELLOW URINE OUT. PASSED GAS, NO BM.

## 2017-09-18 NOTE — NUR
PT SBP ELEVATED. NOT POSITIONAL. NO PRN MEDICATIONS. SPOKE WITH LACIE AVELAR.
ASKED TO START PT ON NITRO AND GIVE LASIX.

## 2017-09-19 VITALS — DIASTOLIC BLOOD PRESSURE: 57 MMHG | SYSTOLIC BLOOD PRESSURE: 111 MMHG

## 2017-09-19 VITALS — SYSTOLIC BLOOD PRESSURE: 111 MMHG | DIASTOLIC BLOOD PRESSURE: 50 MMHG

## 2017-09-19 VITALS — DIASTOLIC BLOOD PRESSURE: 43 MMHG | SYSTOLIC BLOOD PRESSURE: 110 MMHG

## 2017-09-19 VITALS — DIASTOLIC BLOOD PRESSURE: 47 MMHG | SYSTOLIC BLOOD PRESSURE: 124 MMHG

## 2017-09-19 VITALS — SYSTOLIC BLOOD PRESSURE: 114 MMHG | DIASTOLIC BLOOD PRESSURE: 54 MMHG

## 2017-09-19 VITALS — DIASTOLIC BLOOD PRESSURE: 52 MMHG | SYSTOLIC BLOOD PRESSURE: 113 MMHG

## 2017-09-19 VITALS — DIASTOLIC BLOOD PRESSURE: 51 MMHG | SYSTOLIC BLOOD PRESSURE: 119 MMHG

## 2017-09-19 VITALS — DIASTOLIC BLOOD PRESSURE: 51 MMHG | SYSTOLIC BLOOD PRESSURE: 123 MMHG

## 2017-09-19 VITALS — SYSTOLIC BLOOD PRESSURE: 96 MMHG | DIASTOLIC BLOOD PRESSURE: 48 MMHG

## 2017-09-19 VITALS — SYSTOLIC BLOOD PRESSURE: 128 MMHG | DIASTOLIC BLOOD PRESSURE: 58 MMHG

## 2017-09-19 VITALS — DIASTOLIC BLOOD PRESSURE: 46 MMHG | SYSTOLIC BLOOD PRESSURE: 103 MMHG

## 2017-09-19 VITALS — SYSTOLIC BLOOD PRESSURE: 117 MMHG | DIASTOLIC BLOOD PRESSURE: 53 MMHG

## 2017-09-19 VITALS — DIASTOLIC BLOOD PRESSURE: 40 MMHG | SYSTOLIC BLOOD PRESSURE: 94 MMHG

## 2017-09-19 VITALS — DIASTOLIC BLOOD PRESSURE: 54 MMHG | SYSTOLIC BLOOD PRESSURE: 120 MMHG

## 2017-09-19 VITALS — SYSTOLIC BLOOD PRESSURE: 107 MMHG | DIASTOLIC BLOOD PRESSURE: 53 MMHG

## 2017-09-19 VITALS — DIASTOLIC BLOOD PRESSURE: 52 MMHG | SYSTOLIC BLOOD PRESSURE: 112 MMHG

## 2017-09-19 VITALS — DIASTOLIC BLOOD PRESSURE: 58 MMHG | SYSTOLIC BLOOD PRESSURE: 123 MMHG

## 2017-09-19 VITALS — DIASTOLIC BLOOD PRESSURE: 46 MMHG | SYSTOLIC BLOOD PRESSURE: 102 MMHG

## 2017-09-19 VITALS — SYSTOLIC BLOOD PRESSURE: 106 MMHG | DIASTOLIC BLOOD PRESSURE: 36 MMHG

## 2017-09-19 VITALS — SYSTOLIC BLOOD PRESSURE: 125 MMHG | DIASTOLIC BLOOD PRESSURE: 56 MMHG

## 2017-09-19 VITALS — DIASTOLIC BLOOD PRESSURE: 54 MMHG | SYSTOLIC BLOOD PRESSURE: 112 MMHG

## 2017-09-19 VITALS — SYSTOLIC BLOOD PRESSURE: 95 MMHG | DIASTOLIC BLOOD PRESSURE: 46 MMHG

## 2017-09-19 VITALS — SYSTOLIC BLOOD PRESSURE: 104 MMHG | DIASTOLIC BLOOD PRESSURE: 56 MMHG

## 2017-09-19 VITALS — SYSTOLIC BLOOD PRESSURE: 127 MMHG | DIASTOLIC BLOOD PRESSURE: 59 MMHG

## 2017-09-19 VITALS — DIASTOLIC BLOOD PRESSURE: 44 MMHG | SYSTOLIC BLOOD PRESSURE: 100 MMHG

## 2017-09-19 VITALS — SYSTOLIC BLOOD PRESSURE: 120 MMHG | DIASTOLIC BLOOD PRESSURE: 60 MMHG

## 2017-09-19 VITALS — SYSTOLIC BLOOD PRESSURE: 117 MMHG | DIASTOLIC BLOOD PRESSURE: 50 MMHG

## 2017-09-19 VITALS — SYSTOLIC BLOOD PRESSURE: 129 MMHG | DIASTOLIC BLOOD PRESSURE: 61 MMHG

## 2017-09-19 VITALS — SYSTOLIC BLOOD PRESSURE: 99 MMHG | DIASTOLIC BLOOD PRESSURE: 52 MMHG

## 2017-09-19 VITALS — DIASTOLIC BLOOD PRESSURE: 54 MMHG | SYSTOLIC BLOOD PRESSURE: 101 MMHG

## 2017-09-19 VITALS — SYSTOLIC BLOOD PRESSURE: 99 MMHG | DIASTOLIC BLOOD PRESSURE: 46 MMHG

## 2017-09-19 VITALS — DIASTOLIC BLOOD PRESSURE: 45 MMHG | SYSTOLIC BLOOD PRESSURE: 107 MMHG

## 2017-09-19 VITALS — DIASTOLIC BLOOD PRESSURE: 50 MMHG | SYSTOLIC BLOOD PRESSURE: 119 MMHG

## 2017-09-19 VITALS — SYSTOLIC BLOOD PRESSURE: 113 MMHG | DIASTOLIC BLOOD PRESSURE: 48 MMHG

## 2017-09-19 VITALS — SYSTOLIC BLOOD PRESSURE: 114 MMHG | DIASTOLIC BLOOD PRESSURE: 51 MMHG

## 2017-09-19 VITALS — DIASTOLIC BLOOD PRESSURE: 44 MMHG | SYSTOLIC BLOOD PRESSURE: 90 MMHG

## 2017-09-19 VITALS — SYSTOLIC BLOOD PRESSURE: 117 MMHG | DIASTOLIC BLOOD PRESSURE: 55 MMHG

## 2017-09-19 VITALS — SYSTOLIC BLOOD PRESSURE: 107 MMHG | DIASTOLIC BLOOD PRESSURE: 47 MMHG

## 2017-09-19 VITALS — SYSTOLIC BLOOD PRESSURE: 96 MMHG | DIASTOLIC BLOOD PRESSURE: 51 MMHG

## 2017-09-19 LAB
ALBUMIN SERPL-MCNC: 2.8 G/DL (ref 3.4–5)
ALP SERPL-CCNC: 56 U/L (ref 46–116)
ALT SERPL-CCNC: 46 U/L (ref 10–68)
ANION GAP SERPL CALC-SCNC: 7.6 MMOL/L (ref 8–16)
BILIRUB SERPL-MCNC: 0.5 MG/DL (ref 0.2–1.3)
BUN SERPL-MCNC: 21 MG/DL (ref 7–18)
CALCIUM SERPL-MCNC: 8.5 MG/DL (ref 8.5–10.1)
CHLORIDE SERPL-SCNC: 103 MMOL/L (ref 98–107)
CO2 SERPL-SCNC: 31.7 MMOL/L (ref 21–32)
CREAT SERPL-MCNC: 1.1 MG/DL (ref 0.6–1.3)
ERYTHROCYTE [DISTWIDTH] IN BLOOD BY AUTOMATED COUNT: 14.9 % (ref 11.5–14.5)
GLOBULIN SER-MCNC: 2.9 G/L
GLUCOSE SERPL-MCNC: 135 MG/DL (ref 74–106)
HCT VFR BLD CALC: 25.5 % (ref 36–48)
HGB BLD-MCNC: 8.6 G/DL (ref 12–16)
MCH RBC QN AUTO: 32.7 PG (ref 26–34)
MCHC RBC AUTO-ENTMCNC: 33.7 G/DL (ref 31–37)
MCV RBC: 97 FL (ref 80–100)
OSMOLALITY SERPL CALC.SUM OF ELEC: 282 MOSM/KG (ref 275–300)
PLATELET # BLD: 91 10X3/UL (ref 130–400)
PMV BLD AUTO: 11.3 FL (ref 7.4–10.4)
POTASSIUM SERPL-SCNC: 3.3 MMOL/L (ref 3.5–5.1)
PROT SERPL-MCNC: 5.7 G/DL (ref 6.4–8.2)
RBC # BLD AUTO: 2.63 10X6/UL (ref 4–5.4)
SODIUM SERPL-SCNC: 139 MMOL/L (ref 136–145)
WBC # BLD AUTO: 5.5 10X3/UL (ref 4.8–10.8)

## 2017-09-19 NOTE — NUR
* Is the patient Alert and Oriented? Yes  0
* How many steps to enter\exit or inside your home? 1  0
* PCP Dr. Moon  0
* [facility]acy [facility]  0
* Preadmission Environment Home Alone  0
* ADLs Independent  0
* List name and contact numbers for known caregivers / representatives who
currently or will assist patient after discharge: Friend - Melita Daniels
[phone]  0
* Additional services required to return to the preadmission environment? No
0
* Can the patient safely return to the preadmission environment? Yes  0
* Has this patient been hospitalized within the prior 30 days at any hospital?
No
 
 
Patient Name: MAYRA MARTINEZ Admission Status: Elective
Accout number: [id number] Admission Date: 09-
: 1945 Admission Diagnosis:
Attending: ERLIN RYAN Current LOS: 4
 
Anticipated DC Date:
2017
Planned Disposition: Home
Primary Insurance: MEDICARE A & B
 
Discharge Planning Comments: CM met with patient to assess dc plans/needs.
Patient states she lives alone & is independent with all ADL's & IADL's. She
has made arrangements for her friend, Melita Daniels, to stay with her after
discharge. She denies home health services or using any DME at home. She has
arranged for a house keeper to assist her at home as well. Her friend Melita
plans to  a BSC at the [facility] to have on hand should she need it.
No other needs identified or verbalized at this time. CM will follow.
 
 
 
 
 
 
: Keesha Zhu

## 2017-09-19 NOTE — NUR
TO XRAY VIA W/C AND BACK TO ROOM.  LINENS CHANGED.  PT BACK TO BED WITH
MINIMAL ASSIST.  PRN TYLENOL GIVEN FOR C/O INCISIONAL PAIN.  FRESH
WATER/CRANBERRY AT BS.  ALARMS ON AND C/L IN REACH.

## 2017-09-19 NOTE — NUR
REC'D TO CARE, RN ASSESSMENT PER FLOWSHEET.  PT BACK TO BED AFTER BEING UP IN
CHAIR.  VSS.  PT REPOSITIONS WITH MINIMAL ASSIST.  TPM VVI 60 - SENSING.
ALARMS ON AND C/L IN USE.

## 2017-09-19 NOTE — NUR
SPOKE WITH LACIE AVELAR ABOUT DOBUTAMINE DRIP. CURRENTLY AT RATE OF 5MCG/KG/MIN.
TO DROP TO 3MCG. WILL CONTINUE TO MONITOR.

## 2017-09-19 NOTE — NUR
REASSESSMENT PER FLOWSHEET, NO ACUTE CHANGES.  PT AWAKENS EASILY, VSS.  DENIES
PAIN OR NEEDS.  ASSISTED WITH PILLOWS FOR COMFORT.  ALARMS ON AND C/L IN
REACH.

## 2017-09-20 VITALS — DIASTOLIC BLOOD PRESSURE: 50 MMHG | SYSTOLIC BLOOD PRESSURE: 104 MMHG

## 2017-09-20 VITALS — DIASTOLIC BLOOD PRESSURE: 48 MMHG | SYSTOLIC BLOOD PRESSURE: 95 MMHG

## 2017-09-20 VITALS — DIASTOLIC BLOOD PRESSURE: 50 MMHG | SYSTOLIC BLOOD PRESSURE: 114 MMHG

## 2017-09-20 VITALS — DIASTOLIC BLOOD PRESSURE: 51 MMHG | SYSTOLIC BLOOD PRESSURE: 99 MMHG

## 2017-09-20 VITALS — DIASTOLIC BLOOD PRESSURE: 45 MMHG | SYSTOLIC BLOOD PRESSURE: 125 MMHG

## 2017-09-20 VITALS — DIASTOLIC BLOOD PRESSURE: 49 MMHG | SYSTOLIC BLOOD PRESSURE: 109 MMHG

## 2017-09-20 VITALS — SYSTOLIC BLOOD PRESSURE: 108 MMHG | DIASTOLIC BLOOD PRESSURE: 51 MMHG

## 2017-09-20 VITALS — SYSTOLIC BLOOD PRESSURE: 123 MMHG | DIASTOLIC BLOOD PRESSURE: 54 MMHG

## 2017-09-20 VITALS — DIASTOLIC BLOOD PRESSURE: 62 MMHG | SYSTOLIC BLOOD PRESSURE: 121 MMHG

## 2017-09-20 VITALS — SYSTOLIC BLOOD PRESSURE: 115 MMHG | DIASTOLIC BLOOD PRESSURE: 52 MMHG

## 2017-09-20 VITALS — DIASTOLIC BLOOD PRESSURE: 53 MMHG | SYSTOLIC BLOOD PRESSURE: 114 MMHG

## 2017-09-20 VITALS — DIASTOLIC BLOOD PRESSURE: 49 MMHG | SYSTOLIC BLOOD PRESSURE: 100 MMHG

## 2017-09-20 VITALS — DIASTOLIC BLOOD PRESSURE: 56 MMHG | SYSTOLIC BLOOD PRESSURE: 126 MMHG

## 2017-09-20 VITALS — DIASTOLIC BLOOD PRESSURE: 76 MMHG | SYSTOLIC BLOOD PRESSURE: 124 MMHG

## 2017-09-20 VITALS — DIASTOLIC BLOOD PRESSURE: 49 MMHG | SYSTOLIC BLOOD PRESSURE: 108 MMHG

## 2017-09-20 VITALS — DIASTOLIC BLOOD PRESSURE: 60 MMHG | SYSTOLIC BLOOD PRESSURE: 109 MMHG

## 2017-09-20 VITALS — DIASTOLIC BLOOD PRESSURE: 49 MMHG | SYSTOLIC BLOOD PRESSURE: 119 MMHG

## 2017-09-20 VITALS — DIASTOLIC BLOOD PRESSURE: 56 MMHG | SYSTOLIC BLOOD PRESSURE: 115 MMHG

## 2017-09-20 VITALS — DIASTOLIC BLOOD PRESSURE: 41 MMHG | SYSTOLIC BLOOD PRESSURE: 119 MMHG

## 2017-09-20 LAB
ALBUMIN SERPL-MCNC: 2.7 G/DL (ref 3.4–5)
ALP SERPL-CCNC: 50 U/L (ref 46–116)
ALT SERPL-CCNC: 49 U/L (ref 10–68)
ANION GAP SERPL CALC-SCNC: 6.3 MMOL/L (ref 8–16)
BILIRUB SERPL-MCNC: 0.6 MG/DL (ref 0.2–1.3)
BUN SERPL-MCNC: 22 MG/DL (ref 7–18)
CALCIUM SERPL-MCNC: 8.2 MG/DL (ref 8.5–10.1)
CHLORIDE SERPL-SCNC: 104 MMOL/L (ref 98–107)
CO2 SERPL-SCNC: 33.2 MMOL/L (ref 21–32)
CREAT SERPL-MCNC: 1 MG/DL (ref 0.6–1.3)
ERYTHROCYTE [DISTWIDTH] IN BLOOD BY AUTOMATED COUNT: 15 % (ref 11.5–14.5)
GLOBULIN SER-MCNC: 2.8 G/L
GLUCOSE SERPL-MCNC: 102 MG/DL (ref 74–106)
HCT VFR BLD CALC: 24.6 % (ref 36–48)
HGB BLD-MCNC: 8.3 G/DL (ref 12–16)
MCH RBC QN AUTO: 32.9 PG (ref 26–34)
MCHC RBC AUTO-ENTMCNC: 33.7 G/DL (ref 31–37)
MCV RBC: 97.6 FL (ref 80–100)
OSMOLALITY SERPL CALC.SUM OF ELEC: 281 MOSM/KG (ref 275–300)
PLATELET # BLD: 112 10X3/UL (ref 130–400)
PMV BLD AUTO: 10.9 FL (ref 7.4–10.4)
POTASSIUM SERPL-SCNC: 3.5 MMOL/L (ref 3.5–5.1)
PROT SERPL-MCNC: 5.5 G/DL (ref 6.4–8.2)
RBC # BLD AUTO: 2.52 10X6/UL (ref 4–5.4)
SODIUM SERPL-SCNC: 140 MMOL/L (ref 136–145)
WBC # BLD AUTO: 5.6 10X3/UL (ref 4.8–10.8)

## 2017-09-20 NOTE — NUR
Nutrition Follow Up:
Pt is eating 100% meal avg on an AHA diet. +BM 9/19/17. Labs reviewed. Meds
noted including Lasix, MV.
Rec continue current diet.
RD following.

## 2017-09-20 NOTE — NUR
CALLED TO ROOM, OUT OF CHAIR TO BATHROOM, TOLERATED WITH STAND BY ASSIST,
VOIDED 300 CC TO [place] HAT, BACK TO CHAIR, DRESSING CHANGE TO RIGHT NECK,
CLEANED WITH HIBICLENS SOAP, DRIED WITH STERIL GAUZE, TEGADERM DRESSING
APPLIED

## 2017-09-20 NOTE — NUR
RT AT BEDSIDE FOR IS TRAINING, 750 INSPIRATION OBTAINED, CONTINUES TO SIT UP
IN CHAIR WITH NO SIGNS OF DISTRESS, ASSESSMENT COMPLETE PER FLOWSHEET, VSS,
CALL LIGHT IN REACH, VOICES NO NEEDS AT THIS TIME

## 2017-09-20 NOTE — NUR
REASSESSMENT COMPLETED. SEE FLOWSHEET FOR FULL DETAILS. VSS. WILL CONTINUE TO
MONITOR. NO OTHER CHANGES IN STATUS AT THIS TIME.

## 2017-09-20 NOTE — NUR
REPORT RECEIVED AND ASSESSMENT COMPLETED. PT IS A POST OP CABG. ALERT AND
ORIENTED. TPM VVI 60 VMA 10. CURRENTLY SENSING. DRSG'S CDI. PT IS UP WITH PT
DURING DAY SHIFT. WALKED 500 FT TODAY. WILL MONITOR THROUGHOUT SHIFT FOR
COMPLICATIONS.

## 2017-09-20 NOTE — NUR
AMBULATED FROM CHAIR TO BATHROOM WITH STANDBY ASSIST, INDEPENDENT WITH
SKINCARE, BTB, REPOSITIONED UP AND TO BACK, NO OTHER NEEDS AT THIS TIME

## 2017-09-21 VITALS — DIASTOLIC BLOOD PRESSURE: 49 MMHG | SYSTOLIC BLOOD PRESSURE: 108 MMHG

## 2017-09-21 VITALS — DIASTOLIC BLOOD PRESSURE: 50 MMHG | SYSTOLIC BLOOD PRESSURE: 121 MMHG

## 2017-09-21 VITALS — SYSTOLIC BLOOD PRESSURE: 126 MMHG | DIASTOLIC BLOOD PRESSURE: 50 MMHG

## 2017-09-21 VITALS — SYSTOLIC BLOOD PRESSURE: 130 MMHG | DIASTOLIC BLOOD PRESSURE: 60 MMHG

## 2017-09-21 VITALS — DIASTOLIC BLOOD PRESSURE: 54 MMHG | SYSTOLIC BLOOD PRESSURE: 122 MMHG

## 2017-09-21 VITALS — DIASTOLIC BLOOD PRESSURE: 81 MMHG | SYSTOLIC BLOOD PRESSURE: 123 MMHG

## 2017-09-21 VITALS — SYSTOLIC BLOOD PRESSURE: 138 MMHG | DIASTOLIC BLOOD PRESSURE: 49 MMHG

## 2017-09-21 VITALS — SYSTOLIC BLOOD PRESSURE: 130 MMHG | DIASTOLIC BLOOD PRESSURE: 54 MMHG

## 2017-09-21 VITALS — DIASTOLIC BLOOD PRESSURE: 59 MMHG | SYSTOLIC BLOOD PRESSURE: 129 MMHG

## 2017-09-21 VITALS — DIASTOLIC BLOOD PRESSURE: 50 MMHG | SYSTOLIC BLOOD PRESSURE: 134 MMHG

## 2017-09-21 VITALS — DIASTOLIC BLOOD PRESSURE: 42 MMHG | SYSTOLIC BLOOD PRESSURE: 141 MMHG

## 2017-09-21 VITALS — DIASTOLIC BLOOD PRESSURE: 61 MMHG | SYSTOLIC BLOOD PRESSURE: 126 MMHG

## 2017-09-21 VITALS — SYSTOLIC BLOOD PRESSURE: 134 MMHG | DIASTOLIC BLOOD PRESSURE: 49 MMHG

## 2017-09-21 VITALS — SYSTOLIC BLOOD PRESSURE: 107 MMHG | DIASTOLIC BLOOD PRESSURE: 43 MMHG

## 2017-09-21 VITALS — DIASTOLIC BLOOD PRESSURE: 59 MMHG | SYSTOLIC BLOOD PRESSURE: 138 MMHG

## 2017-09-21 VITALS — SYSTOLIC BLOOD PRESSURE: 113 MMHG | DIASTOLIC BLOOD PRESSURE: 48 MMHG

## 2017-09-21 VITALS — SYSTOLIC BLOOD PRESSURE: 122 MMHG | DIASTOLIC BLOOD PRESSURE: 54 MMHG

## 2017-09-21 VITALS — DIASTOLIC BLOOD PRESSURE: 47 MMHG | SYSTOLIC BLOOD PRESSURE: 119 MMHG

## 2017-09-21 VITALS — SYSTOLIC BLOOD PRESSURE: 136 MMHG | DIASTOLIC BLOOD PRESSURE: 53 MMHG

## 2017-09-21 VITALS — DIASTOLIC BLOOD PRESSURE: 53 MMHG | SYSTOLIC BLOOD PRESSURE: 140 MMHG

## 2017-09-21 VITALS — SYSTOLIC BLOOD PRESSURE: 118 MMHG | DIASTOLIC BLOOD PRESSURE: 47 MMHG

## 2017-09-21 VITALS — DIASTOLIC BLOOD PRESSURE: 52 MMHG | SYSTOLIC BLOOD PRESSURE: 116 MMHG

## 2017-09-21 VITALS — DIASTOLIC BLOOD PRESSURE: 79 MMHG | SYSTOLIC BLOOD PRESSURE: 121 MMHG

## 2017-09-21 VITALS — SYSTOLIC BLOOD PRESSURE: 130 MMHG | DIASTOLIC BLOOD PRESSURE: 59 MMHG

## 2017-09-21 NOTE — NUR
NIGHT MEDS GIVEN WITHOUT PROBLEM, FRESH ICE WATER AND CRANBERRY JUICE
PROVIDED. DENIES FURTHER NEED.

## 2017-09-21 NOTE — NUR
PT HAS BEEN UP TO WALK WITH PHYSICAL THERAPY. WALKED ON ROOM AIR, SAT READING
BETWEEN 85-89 ON 2 DIFFERENT PORTABLE MONITORS. READING OF 92% WHEN PLACED ON
ROOM MONITOR. PT PLACED ON 1LNC.

## 2017-09-21 NOTE — NUR
SHIFT ASSESSMENT COMPLETE, SEE FLOWSHEET FOR DETAILS. A&O X4, NSR ON MONITOR,
TPM VVI 60, SENSING. ROOM AIR O2 SAT 93-96%. UP TO BATHROOM, STEADY GATE.
TOLERATES WELL. VSS, CL IN REACH, WILL MONITOR.

## 2017-09-21 NOTE — NUR
REASSESSMENT COMPLETE, NO CHANGES. NSR ON MONITOR. TPM SENSING. VSS, DENIES
NEED AT THIS TIME. WILL MONITOR.

## 2017-09-21 NOTE — NUR
REPORT RECEIVED. ASSUMED CARE OF PATIENT. UP TO TOILET. ALERT AND ORIENTED. NO
ASSIST NEEDED EXCEPT FOR MONITORING EQUIPMENT REMOVAL.

## 2017-09-21 NOTE — NUR
RADIOLOGY AT BEDSIDE FOR PA AND LAT. WILL ACCOMPANY PT TO DEPARTMENT FOR
IMAGING. I&O COLLECTED. NO OTHER CHANGES AT THIS TIME. VSS. WILL MONITOR

## 2017-09-21 NOTE — NUR
REASSESSMENT COMPLETED. SEE FLOWSHEET FOR FULL DETAILS. VSS AT THIS TIME. NO
OTHER CHANGES IN STATUS. WILL CONTINUE TO MONITOR.

## 2017-09-22 VITALS — DIASTOLIC BLOOD PRESSURE: 58 MMHG | SYSTOLIC BLOOD PRESSURE: 152 MMHG

## 2017-09-22 VITALS — SYSTOLIC BLOOD PRESSURE: 126 MMHG | DIASTOLIC BLOOD PRESSURE: 62 MMHG

## 2017-09-22 VITALS — SYSTOLIC BLOOD PRESSURE: 108 MMHG | DIASTOLIC BLOOD PRESSURE: 47 MMHG

## 2017-09-22 VITALS — SYSTOLIC BLOOD PRESSURE: 159 MMHG | DIASTOLIC BLOOD PRESSURE: 66 MMHG

## 2017-09-22 VITALS — SYSTOLIC BLOOD PRESSURE: 158 MMHG | DIASTOLIC BLOOD PRESSURE: 60 MMHG

## 2017-09-22 VITALS — SYSTOLIC BLOOD PRESSURE: 107 MMHG | DIASTOLIC BLOOD PRESSURE: 54 MMHG

## 2017-09-22 VITALS — SYSTOLIC BLOOD PRESSURE: 143 MMHG | DIASTOLIC BLOOD PRESSURE: 58 MMHG

## 2017-09-22 VITALS — SYSTOLIC BLOOD PRESSURE: 138 MMHG | DIASTOLIC BLOOD PRESSURE: 57 MMHG

## 2017-09-22 VITALS — SYSTOLIC BLOOD PRESSURE: 127 MMHG | DIASTOLIC BLOOD PRESSURE: 52 MMHG

## 2017-09-22 VITALS — DIASTOLIC BLOOD PRESSURE: 35 MMHG | SYSTOLIC BLOOD PRESSURE: 128 MMHG

## 2017-09-22 VITALS — SYSTOLIC BLOOD PRESSURE: 132 MMHG | DIASTOLIC BLOOD PRESSURE: 51 MMHG

## 2017-09-22 VITALS — DIASTOLIC BLOOD PRESSURE: 64 MMHG | SYSTOLIC BLOOD PRESSURE: 146 MMHG

## 2017-09-22 VITALS — DIASTOLIC BLOOD PRESSURE: 64 MMHG | SYSTOLIC BLOOD PRESSURE: 152 MMHG

## 2017-09-22 VITALS — DIASTOLIC BLOOD PRESSURE: 41 MMHG | SYSTOLIC BLOOD PRESSURE: 122 MMHG

## 2017-09-22 VITALS — DIASTOLIC BLOOD PRESSURE: 55 MMHG | SYSTOLIC BLOOD PRESSURE: 121 MMHG

## 2017-09-22 VITALS — DIASTOLIC BLOOD PRESSURE: 41 MMHG | SYSTOLIC BLOOD PRESSURE: 119 MMHG

## 2017-09-22 VITALS — SYSTOLIC BLOOD PRESSURE: 136 MMHG | DIASTOLIC BLOOD PRESSURE: 58 MMHG

## 2017-09-22 VITALS — SYSTOLIC BLOOD PRESSURE: 125 MMHG | DIASTOLIC BLOOD PRESSURE: 52 MMHG

## 2017-09-22 VITALS — DIASTOLIC BLOOD PRESSURE: 46 MMHG | SYSTOLIC BLOOD PRESSURE: 101 MMHG

## 2017-09-22 VITALS — SYSTOLIC BLOOD PRESSURE: 141 MMHG | DIASTOLIC BLOOD PRESSURE: 51 MMHG

## 2017-09-22 VITALS — DIASTOLIC BLOOD PRESSURE: 52 MMHG | SYSTOLIC BLOOD PRESSURE: 112 MMHG

## 2017-09-22 VITALS — DIASTOLIC BLOOD PRESSURE: 62 MMHG | SYSTOLIC BLOOD PRESSURE: 161 MMHG

## 2017-09-22 VITALS — DIASTOLIC BLOOD PRESSURE: 52 MMHG | SYSTOLIC BLOOD PRESSURE: 104 MMHG

## 2017-09-22 VITALS — SYSTOLIC BLOOD PRESSURE: 151 MMHG | DIASTOLIC BLOOD PRESSURE: 63 MMHG

## 2017-09-22 NOTE — NUR
O2 SAT NOTED TO BE 84% ON MONITOR.  PT FOUND ASLEEP IN CHAIR WITH MOUTH OPEN.
WOKE PT UP SAT IMMEDIATELY 91% ON ROOM AIR.  LACIE AVELAR NOTIFIED.  INCREASE IS
AND AMBULATION.

## 2017-09-22 NOTE — NUR
SHIFT ASSESSMENT COMPLETE, SEE FLOWSHEET FOR ALL DETIALS. A&O X4, VSS. NSR ON
MONITOR. ROOM AIR WITH O2 SAT 94%. IS COMPLETED, PATIENT HAD GOOD EFFORT,
REACHED 1000, STATES SHE IS TIRED FROM TODAY. CRANBERRY JUICE AND WATER
PROVIDED PER REQUESTS. DENIES FURTHER NEED AT THIS TIME.

## 2017-09-22 NOTE — NUR
PATIENT DESATS WHEN SLEEPING, WOKEN UP AND DID DEEP BREATHING AND COUGHING.
PASSIVE EFFORT. REMINDED HER THE EXERCISES WERE NEEDED TO IMPROVE RESPIRATORY
QUALITY, STATES UNDERSTANDING.

## 2017-09-22 NOTE — NUR
BACK FROM XRAY, TOLERATED WELL, DID DESAT TO 87% WITH ACTIVITY. BACK TO BED
AND PLACED ON 1L O2. NOW SAT IS 93%. WILL MONITOR. DENIES NEED AT THIS TIME,
VSS.

## 2017-09-22 NOTE — NUR
REASSESSMENT COMPLETE, SEE FLOWSHEET. NO ACUTE CHANGES. IS COMPLETED WITH RT.
DENIES NEED AT THIS TIME. VSS.

## 2017-09-22 NOTE — NUR
Nutrition Follow Up:
Pt is eating 100% meal avg on an AHA diet. +BM 9/21/17. Wt loss noted. Labs
reviewed. Meds noted including Lasix.
Rec continue current diet.
RD following.

## 2017-09-23 VITALS — DIASTOLIC BLOOD PRESSURE: 46 MMHG | SYSTOLIC BLOOD PRESSURE: 99 MMHG

## 2017-09-23 VITALS — DIASTOLIC BLOOD PRESSURE: 67 MMHG | SYSTOLIC BLOOD PRESSURE: 95 MMHG

## 2017-09-23 VITALS — SYSTOLIC BLOOD PRESSURE: 116 MMHG | DIASTOLIC BLOOD PRESSURE: 65 MMHG

## 2017-09-23 VITALS — SYSTOLIC BLOOD PRESSURE: 121 MMHG | DIASTOLIC BLOOD PRESSURE: 53 MMHG

## 2017-09-23 VITALS — SYSTOLIC BLOOD PRESSURE: 106 MMHG | DIASTOLIC BLOOD PRESSURE: 58 MMHG

## 2017-09-23 VITALS — SYSTOLIC BLOOD PRESSURE: 129 MMHG | DIASTOLIC BLOOD PRESSURE: 46 MMHG

## 2017-09-23 VITALS — SYSTOLIC BLOOD PRESSURE: 100 MMHG | DIASTOLIC BLOOD PRESSURE: 49 MMHG

## 2017-09-23 VITALS — DIASTOLIC BLOOD PRESSURE: 54 MMHG | SYSTOLIC BLOOD PRESSURE: 129 MMHG

## 2017-09-23 VITALS — DIASTOLIC BLOOD PRESSURE: 44 MMHG | SYSTOLIC BLOOD PRESSURE: 112 MMHG

## 2017-09-23 VITALS — SYSTOLIC BLOOD PRESSURE: 120 MMHG | DIASTOLIC BLOOD PRESSURE: 63 MMHG

## 2017-09-23 VITALS — SYSTOLIC BLOOD PRESSURE: 121 MMHG | DIASTOLIC BLOOD PRESSURE: 62 MMHG

## 2017-09-23 VITALS — SYSTOLIC BLOOD PRESSURE: 102 MMHG | DIASTOLIC BLOOD PRESSURE: 51 MMHG

## 2017-09-23 VITALS — DIASTOLIC BLOOD PRESSURE: 70 MMHG | SYSTOLIC BLOOD PRESSURE: 118 MMHG

## 2017-09-23 NOTE — NUR
LEFT SUBCLAVIAN CVL DC'D PER ORDER.  MANUAL PRESSURE APPLIED TIMES 7 MINUTES.
CLEAR DRESSING APPLIED.  PT INSTRUCTED ON S/SX OF BLEEDING OR SWELLING TO
REPORT.  TO REMAIN FLAT UNTIL 1330.  C/L IN REACH.

## 2017-10-12 ENCOUNTER — HOSPITAL ENCOUNTER (OUTPATIENT)
Dept: HOSPITAL 84 - D.LAB | Age: 72
Discharge: HOME | End: 2017-10-12
Attending: THORACIC SURGERY (CARDIOTHORACIC VASCULAR SURGERY)
Payer: MEDICARE

## 2017-10-12 VITALS — BODY MASS INDEX: 32.5 KG/M2

## 2017-10-12 DIAGNOSIS — J90: ICD-10-CM

## 2017-10-12 DIAGNOSIS — D64.9: Primary | ICD-10-CM

## 2017-10-12 LAB
ANION GAP SERPL CALC-SCNC: 12 MMOL/L (ref 8–16)
BUN SERPL-MCNC: 21 MG/DL (ref 7–18)
CALCIUM SERPL-MCNC: 9.3 MG/DL (ref 8.5–10.1)
CHLORIDE SERPL-SCNC: 104 MMOL/L (ref 98–107)
CO2 SERPL-SCNC: 28 MMOL/L (ref 21–32)
CREAT SERPL-MCNC: 1 MG/DL (ref 0.6–1.3)
ERYTHROCYTE [DISTWIDTH] IN BLOOD BY AUTOMATED COUNT: 15.7 % (ref 11.5–14.5)
GLUCOSE SERPL-MCNC: 91 MG/DL (ref 74–106)
HCT VFR BLD CALC: 33.1 % (ref 36–48)
HGB BLD-MCNC: 10.9 G/DL (ref 12–16)
MCH RBC QN AUTO: 32.6 PG (ref 26–34)
MCHC RBC AUTO-ENTMCNC: 32.9 G/DL (ref 31–37)
MCV RBC: 99.1 FL (ref 80–100)
OSMOLALITY SERPL CALC.SUM OF ELEC: 281 MOSM/KG (ref 275–300)
PLATELET # BLD: 203 10X3/UL (ref 130–400)
PMV BLD AUTO: 10.9 FL (ref 7.4–10.4)
POTASSIUM SERPL-SCNC: 4 MMOL/L (ref 3.5–5.1)
RBC # BLD AUTO: 3.34 10X6/UL (ref 4–5.4)
SODIUM SERPL-SCNC: 140 MMOL/L (ref 136–145)
WBC # BLD AUTO: 6.1 10X3/UL (ref 4.8–10.8)

## 2017-10-14 ENCOUNTER — HOSPITAL ENCOUNTER (EMERGENCY)
Dept: HOSPITAL 84 - D.ER | Age: 72
LOS: 1 days | Discharge: HOME | End: 2017-10-15
Payer: MEDICARE

## 2017-10-14 VITALS — BODY MASS INDEX: 32.5 KG/M2

## 2017-10-14 DIAGNOSIS — I47.1: Primary | ICD-10-CM

## 2017-10-14 DIAGNOSIS — I10: ICD-10-CM

## 2017-10-14 DIAGNOSIS — I24.8: ICD-10-CM

## 2017-10-14 DIAGNOSIS — R79.89: ICD-10-CM

## 2017-10-14 LAB
ALBUMIN SERPL-MCNC: 3.7 G/DL (ref 3.4–5)
ALP SERPL-CCNC: 125 U/L (ref 46–116)
ALT SERPL-CCNC: 56 U/L (ref 10–68)
ANION GAP SERPL CALC-SCNC: 15.2 MMOL/L (ref 8–16)
BASOPHILS NFR BLD AUTO: 0.4 % (ref 0–2)
BILIRUB SERPL-MCNC: 0.32 MG/DL (ref 0.2–1.3)
BUN SERPL-MCNC: 21 MG/DL (ref 7–18)
CALCIUM SERPL-MCNC: 9.7 MG/DL (ref 8.5–10.1)
CHLORIDE SERPL-SCNC: 106 MMOL/L (ref 98–107)
CK SERPL-CCNC: 40 UL (ref 21–215)
CO2 SERPL-SCNC: 27.7 MMOL/L (ref 21–32)
CREAT SERPL-MCNC: 1.2 MG/DL (ref 0.6–1.3)
EOSINOPHIL NFR BLD: 3.9 % (ref 0–7)
ERYTHROCYTE [DISTWIDTH] IN BLOOD BY AUTOMATED COUNT: 15.9 % (ref 11.5–14.5)
GLOBULIN SER-MCNC: 3.8 G/L
GLUCOSE SERPL-MCNC: 125 MG/DL (ref 74–106)
HCT VFR BLD CALC: 34 % (ref 36–48)
HGB BLD-MCNC: 11 G/DL (ref 12–16)
IMM GRANULOCYTES NFR BLD: 0.1 % (ref 0–5)
LYMPHOCYTES NFR BLD AUTO: 25.8 % (ref 15–50)
MCH RBC QN AUTO: 32.4 PG (ref 26–34)
MCHC RBC AUTO-ENTMCNC: 32.4 G/DL (ref 31–37)
MCV RBC: 100 FL (ref 80–100)
MONOCYTES NFR BLD: 7.1 % (ref 2–11)
NEUTROPHILS NFR BLD AUTO: 62.7 % (ref 40–80)
OSMOLALITY SERPL CALC.SUM OF ELEC: 292 MOSM/KG (ref 275–300)
PLATELET # BLD: 211 10X3/UL (ref 130–400)
PMV BLD AUTO: 11.7 FL (ref 7.4–10.4)
POTASSIUM SERPL-SCNC: 3.9 MMOL/L (ref 3.5–5.1)
PROT SERPL-MCNC: 7.5 G/DL (ref 6.4–8.2)
RBC # BLD AUTO: 3.4 10X6/UL (ref 4–5.4)
SODIUM SERPL-SCNC: 145 MMOL/L (ref 136–145)
TROPONIN I SERPL-MCNC: 0.08 NG/ML (ref 0–0.06)
WBC # BLD AUTO: 8.6 10X3/UL (ref 4.8–10.8)

## 2017-10-15 ENCOUNTER — HOSPITAL ENCOUNTER (OUTPATIENT)
Dept: HOSPITAL 84 - D.ER | Age: 72
Setting detail: OBSERVATION
LOS: 1 days | Discharge: HOME | End: 2017-10-16
Attending: INTERNAL MEDICINE | Admitting: INTERNAL MEDICINE
Payer: MEDICARE

## 2017-10-15 VITALS — SYSTOLIC BLOOD PRESSURE: 98 MMHG | DIASTOLIC BLOOD PRESSURE: 76 MMHG

## 2017-10-15 VITALS — BODY MASS INDEX: 16.9 KG/M2

## 2017-10-15 DIAGNOSIS — E03.9: ICD-10-CM

## 2017-10-15 DIAGNOSIS — I47.1: Primary | ICD-10-CM

## 2017-10-15 DIAGNOSIS — I10: ICD-10-CM

## 2017-10-15 DIAGNOSIS — I25.10: ICD-10-CM

## 2017-10-15 LAB
APPEARANCE UR: (no result)
BACTERIA #/AREA URNS HPF: (no result) /HPF
BILIRUB SERPL-MCNC: NEGATIVE MG/DL
CK SERPL-CCNC: 33 UL (ref 21–215)
COLOR UR: YELLOW
GLUCOSE SERPL-MCNC: NEGATIVE MG/DL
KETONES UR STRIP-MCNC: NEGATIVE MG/DL
MAGNESIUM SERPL-MCNC: 2 MG/DL (ref 1.8–2.4)
NITRITE UR-MCNC: NEGATIVE MG/ML
PH UR STRIP: 5 [PH] (ref 5–6)
PROT UR-MCNC: (no result) MG/DL
RBC #/AREA URNS HPF: (no result) /HPF (ref 0–5)
SP GR UR STRIP: 1.01 (ref 1–1.02)
SQUAMOUS #/AREA URNS HPF: (no result) /HPF (ref 0–5)
TROPONIN I SERPL-MCNC: 0.25 NG/ML (ref 0–0.06)
TSH SERPL-ACNC: 1.97 UIU/ML (ref 0.36–3.74)
UROBILINOGEN UR-MCNC: NORMAL MG/DL
WBC #/AREA URNS HPF: (no result) /HPF (ref 0–5)

## 2017-10-16 VITALS — SYSTOLIC BLOOD PRESSURE: 180 MMHG | DIASTOLIC BLOOD PRESSURE: 80 MMHG

## 2017-10-16 VITALS — DIASTOLIC BLOOD PRESSURE: 64 MMHG | SYSTOLIC BLOOD PRESSURE: 157 MMHG

## 2017-10-16 VITALS — SYSTOLIC BLOOD PRESSURE: 132 MMHG | DIASTOLIC BLOOD PRESSURE: 60 MMHG

## 2017-10-24 ENCOUNTER — HOSPITAL ENCOUNTER (OUTPATIENT)
Dept: HOSPITAL 84 - D.CT | Age: 72
Discharge: HOME | End: 2017-10-24
Attending: FAMILY MEDICINE
Payer: MEDICARE

## 2017-10-24 VITALS — BODY MASS INDEX: 16.9 KG/M2

## 2017-10-24 DIAGNOSIS — R91.1: Primary | ICD-10-CM

## 2017-11-22 ENCOUNTER — HOSPITAL ENCOUNTER (OUTPATIENT)
Dept: HOSPITAL 84 - D.CT | Age: 72
Discharge: HOME | End: 2017-11-22
Attending: FAMILY MEDICINE
Payer: MEDICARE

## 2017-11-22 VITALS — BODY MASS INDEX: 16.9 KG/M2

## 2017-11-22 DIAGNOSIS — R06.00: Primary | ICD-10-CM

## 2017-12-12 NOTE — SS
PATIENT:MAYRA RODRIGUEZ                 :[birth year]   MEDICAL RECORD: [id number]
 
                              DISCHARGE SUMMARY
                                                         
ADMISSION DATE:    10/15/17                       DISCHARGE DATE:     10/16/17
 
 
DISCHARGE DIAGNOSES:
1.  Supraventricular tachycardia.
2.  Hypertension.
 
HOSPITAL COURSE:  Ms. Rodriguez presents with palpitations and supraventricular
tachycardia, had the addition of Cardizem to her Bystolic.  She had no further
dysrhythmia, discharged home with the addition of Cardizem 180 to the Bystolic. 
We will follow up with [facility] in 1 month.
 
TRANSINT:[id number] Voice Confirmation ID: [id number] DOCUMENT ID: [id number]
                                           
                                           ROSA LEES MD             
 
 
 
Electronically Signed by ROSA LEES on 17 at 1217
 
 
 
 
 
 
 
 
 
 
 
 
 
 
 
 
 
 
 
 
 
 
 
 
 
 
 
 
CC:                                                             [id number]
DICTATION DATE: 17 1226     :     17 1509      DIS IN  
                                                                      10/16/17
[id number] [place] [address]

## 2018-01-11 ENCOUNTER — HOSPITAL ENCOUNTER (OUTPATIENT)
Dept: HOSPITAL 84 - D.RAD | Age: 73
Discharge: HOME | End: 2018-01-11
Attending: THORACIC SURGERY (CARDIOTHORACIC VASCULAR SURGERY)
Payer: MEDICARE

## 2018-01-11 VITALS — BODY MASS INDEX: 16.9 KG/M2

## 2018-01-11 DIAGNOSIS — J90: Primary | ICD-10-CM

## 2018-01-11 LAB
ANION GAP SERPL CALC-SCNC: 9.1 MMOL/L (ref 8–16)
BUN SERPL-MCNC: 29 MG/DL (ref 7–18)
CALCIUM SERPL-MCNC: 9.4 MG/DL (ref 8.5–10.1)
CHLORIDE SERPL-SCNC: 105 MMOL/L (ref 98–107)
CO2 SERPL-SCNC: 29.5 MMOL/L (ref 21–32)
CREAT SERPL-MCNC: 0.9 MG/DL (ref 0.6–1.3)
ERYTHROCYTE [DISTWIDTH] IN BLOOD BY AUTOMATED COUNT: 15 % (ref 11.5–14.5)
GLUCOSE SERPL-MCNC: 88 MG/DL (ref 74–106)
HCT VFR BLD CALC: 34.3 % (ref 36–48)
HGB BLD-MCNC: 11.7 G/DL (ref 12–16)
MCH RBC QN AUTO: 33.3 PG (ref 26–34)
MCHC RBC AUTO-ENTMCNC: 34.1 G/DL (ref 31–37)
MCV RBC: 97.7 FL (ref 80–100)
OSMOLALITY SERPL CALC.SUM OF ELEC: 282 MOSM/KG (ref 275–300)
PLATELET # BLD: 202 10X3/UL (ref 130–400)
PMV BLD AUTO: 10.4 FL (ref 7.4–10.4)
POTASSIUM SERPL-SCNC: 4.6 MMOL/L (ref 3.5–5.1)
RBC # BLD AUTO: 3.51 10X6/UL (ref 4–5.4)
SODIUM SERPL-SCNC: 139 MMOL/L (ref 136–145)
WBC # BLD AUTO: 5.6 10X3/UL (ref 4.8–10.8)

## 2018-07-10 ENCOUNTER — HOSPITAL ENCOUNTER (OUTPATIENT)
Dept: HOSPITAL 84 - D.US | Age: 73
Discharge: HOME | End: 2018-07-10
Attending: THORACIC SURGERY (CARDIOTHORACIC VASCULAR SURGERY)
Payer: MEDICARE

## 2018-07-10 VITALS — BODY MASS INDEX: 16.9 KG/M2

## 2018-07-10 DIAGNOSIS — I65.23: Primary | ICD-10-CM

## 2018-08-22 ENCOUNTER — HOSPITAL ENCOUNTER (OUTPATIENT)
Dept: HOSPITAL 84 - D.CATH | Age: 73
Discharge: HOME | End: 2018-08-22
Attending: INTERNAL MEDICINE
Payer: MEDICARE

## 2018-08-22 VITALS — BODY MASS INDEX: 29.26 KG/M2 | HEIGHT: 64 IN | WEIGHT: 171.36 LBS | BODY MASS INDEX: 29.26 KG/M2

## 2018-08-22 VITALS — SYSTOLIC BLOOD PRESSURE: 139 MMHG | DIASTOLIC BLOOD PRESSURE: 74 MMHG

## 2018-08-22 DIAGNOSIS — I25.119: Primary | ICD-10-CM

## 2018-08-22 DIAGNOSIS — Z01.812: ICD-10-CM

## 2018-08-22 DIAGNOSIS — Z95.1: ICD-10-CM

## 2018-08-22 LAB
ANION GAP SERPL CALC-SCNC: 11.1 MMOL/L (ref 8–16)
BASOPHILS NFR BLD AUTO: 0.3 % (ref 0–2)
BUN SERPL-MCNC: 26 MG/DL (ref 7–18)
CALCIUM SERPL-MCNC: 8.7 MG/DL (ref 8.5–10.1)
CHLORIDE SERPL-SCNC: 106 MMOL/L (ref 98–107)
CO2 SERPL-SCNC: 26.2 MMOL/L (ref 21–32)
CREAT SERPL-MCNC: 1 MG/DL (ref 0.6–1.3)
EOSINOPHIL NFR BLD: 2.4 % (ref 0–7)
ERYTHROCYTE [DISTWIDTH] IN BLOOD BY AUTOMATED COUNT: 13.5 % (ref 11.5–14.5)
GLUCOSE SERPL-MCNC: 90 MG/DL (ref 74–106)
HCT VFR BLD CALC: 32.8 % (ref 36–48)
HGB BLD-MCNC: 11.6 G/DL (ref 12–16)
IMM GRANULOCYTES NFR BLD: 0.2 % (ref 0–5)
LYMPHOCYTES NFR BLD AUTO: 31.8 % (ref 15–50)
MCH RBC QN AUTO: 34 PG (ref 26–34)
MCHC RBC AUTO-ENTMCNC: 35.4 G/DL (ref 31–37)
MCV RBC: 96.2 FL (ref 80–100)
MONOCYTES NFR BLD: 5.7 % (ref 2–11)
NEUTROPHILS NFR BLD AUTO: 59.6 % (ref 40–80)
OSMOLALITY SERPL CALC.SUM OF ELEC: 282 MOSM/KG (ref 275–300)
PLATELET # BLD: 175 10X3/UL (ref 130–400)
PMV BLD AUTO: 11 FL (ref 7.4–10.4)
POTASSIUM SERPL-SCNC: 4.3 MMOL/L (ref 3.5–5.1)
RBC # BLD AUTO: 3.41 10X6/UL (ref 4–5.4)
SODIUM SERPL-SCNC: 139 MMOL/L (ref 136–145)
WBC # BLD AUTO: 5.8 10X3/UL (ref 4.8–10.8)

## 2018-10-08 ENCOUNTER — HOSPITAL ENCOUNTER (OUTPATIENT)
Dept: HOSPITAL 84 - D.CT | Age: 73
Discharge: HOME | End: 2018-10-08
Attending: THORACIC SURGERY (CARDIOTHORACIC VASCULAR SURGERY)
Payer: MEDICARE

## 2018-10-08 VITALS — BODY MASS INDEX: 29.4 KG/M2

## 2018-10-08 DIAGNOSIS — R91.1: Primary | ICD-10-CM

## 2018-10-18 ENCOUNTER — HOSPITAL ENCOUNTER (OUTPATIENT)
Dept: HOSPITAL 84 - D.ECHO | Age: 73
Discharge: HOME | End: 2018-10-18
Attending: THORACIC SURGERY (CARDIOTHORACIC VASCULAR SURGERY)
Payer: MEDICARE

## 2018-10-18 VITALS — BODY MASS INDEX: 29.4 KG/M2

## 2018-10-18 DIAGNOSIS — R06.02: Primary | ICD-10-CM

## 2018-12-03 ENCOUNTER — HOSPITAL ENCOUNTER (OUTPATIENT)
Dept: HOSPITAL 84 - D.CT | Age: 73
Discharge: HOME | End: 2018-12-03
Attending: THORACIC SURGERY (CARDIOTHORACIC VASCULAR SURGERY)
Payer: MEDICARE

## 2018-12-03 VITALS — BODY MASS INDEX: 29.4 KG/M2

## 2018-12-03 DIAGNOSIS — I65.23: Primary | ICD-10-CM

## 2018-12-07 LAB
ALBUMIN SERPL-MCNC: 3.9 G/DL (ref 3.4–5)
ALP SERPL-CCNC: 80 U/L (ref 46–116)
ALT SERPL-CCNC: 28 U/L (ref 10–68)
ANION GAP SERPL CALC-SCNC: 12.1 MMOL/L (ref 8–16)
APPEARANCE UR: CLEAR
APTT BLD: 30 SECONDS (ref 22.8–39.4)
BILIRUB SERPL-MCNC: 0.42 MG/DL (ref 0.2–1.3)
BILIRUB SERPL-MCNC: NEGATIVE MG/DL
BUN SERPL-MCNC: 24 MG/DL (ref 7–18)
CALCIUM SERPL-MCNC: 8.9 MG/DL (ref 8.5–10.1)
CHLORIDE SERPL-SCNC: 109 MMOL/L (ref 98–107)
CO2 SERPL-SCNC: 26.1 MMOL/L (ref 21–32)
COLOR UR: YELLOW
CREAT SERPL-MCNC: 0.9 MG/DL (ref 0.6–1.3)
ERYTHROCYTE [DISTWIDTH] IN BLOOD BY AUTOMATED COUNT: 13.7 % (ref 11.5–14.5)
GLOBULIN SER-MCNC: 3.9 G/L
GLUCOSE SERPL-MCNC: 92 MG/DL (ref 74–106)
GLUCOSE SERPL-MCNC: NEGATIVE MG/DL
HCT VFR BLD CALC: 35.3 % (ref 36–48)
HGB BLD-MCNC: 12.2 G/DL (ref 12–16)
INR PPP: 1.03 (ref 0.85–1.17)
KETONES UR STRIP-MCNC: NEGATIVE MG/DL
MCH RBC QN AUTO: 33.6 PG (ref 26–34)
MCHC RBC AUTO-ENTMCNC: 34.6 G/DL (ref 31–37)
MCV RBC: 97.2 FL (ref 80–100)
NITRITE UR-MCNC: NEGATIVE MG/ML
OSMOLALITY SERPL CALC.SUM OF ELEC: 288 MOSM/KG (ref 275–300)
PH UR STRIP: 5 [PH] (ref 5–6)
PLATELET # BLD: 199 10X3/UL (ref 130–400)
PMV BLD AUTO: 10.7 FL (ref 7.4–10.4)
POTASSIUM SERPL-SCNC: 4.2 MMOL/L (ref 3.5–5.1)
PROT SERPL-MCNC: 7.8 G/DL (ref 6.4–8.2)
PROT UR-MCNC: NEGATIVE MG/DL
PROTHROMBIN TIME: 13 SECONDS (ref 11.6–15)
RBC # BLD AUTO: 3.63 10X6/UL (ref 4–5.4)
SODIUM SERPL-SCNC: 143 MMOL/L (ref 136–145)
SP GR UR STRIP: 1.02 (ref 1–1.02)
UROBILINOGEN UR-MCNC: NORMAL MG/DL
WBC # BLD AUTO: 6.1 10X3/UL (ref 4.8–10.8)

## 2018-12-11 ENCOUNTER — HOSPITAL ENCOUNTER (INPATIENT)
Dept: HOSPITAL 84 - D.SDCHOLD | Age: 73
LOS: 2 days | Discharge: HOME | DRG: 39 | End: 2018-12-13
Attending: THORACIC SURGERY (CARDIOTHORACIC VASCULAR SURGERY) | Admitting: THORACIC SURGERY (CARDIOTHORACIC VASCULAR SURGERY)
Payer: MEDICARE

## 2018-12-11 VITALS — SYSTOLIC BLOOD PRESSURE: 130 MMHG | DIASTOLIC BLOOD PRESSURE: 65 MMHG

## 2018-12-11 VITALS — DIASTOLIC BLOOD PRESSURE: 43 MMHG | SYSTOLIC BLOOD PRESSURE: 110 MMHG

## 2018-12-11 VITALS
WEIGHT: 172.26 LBS | WEIGHT: 172.26 LBS | HEIGHT: 64 IN | BODY MASS INDEX: 29.41 KG/M2 | BODY MASS INDEX: 29.41 KG/M2 | HEIGHT: 64 IN | BODY MASS INDEX: 29.41 KG/M2 | BODY MASS INDEX: 29.41 KG/M2

## 2018-12-11 VITALS — SYSTOLIC BLOOD PRESSURE: 137 MMHG | DIASTOLIC BLOOD PRESSURE: 52 MMHG

## 2018-12-11 VITALS — DIASTOLIC BLOOD PRESSURE: 56 MMHG | SYSTOLIC BLOOD PRESSURE: 134 MMHG

## 2018-12-11 VITALS — DIASTOLIC BLOOD PRESSURE: 45 MMHG | SYSTOLIC BLOOD PRESSURE: 117 MMHG

## 2018-12-11 VITALS — SYSTOLIC BLOOD PRESSURE: 115 MMHG | DIASTOLIC BLOOD PRESSURE: 58 MMHG

## 2018-12-11 VITALS — DIASTOLIC BLOOD PRESSURE: 38 MMHG | SYSTOLIC BLOOD PRESSURE: 100 MMHG

## 2018-12-11 VITALS — SYSTOLIC BLOOD PRESSURE: 141 MMHG | DIASTOLIC BLOOD PRESSURE: 58 MMHG

## 2018-12-11 VITALS — SYSTOLIC BLOOD PRESSURE: 122 MMHG | DIASTOLIC BLOOD PRESSURE: 67 MMHG

## 2018-12-11 VITALS — DIASTOLIC BLOOD PRESSURE: 44 MMHG | SYSTOLIC BLOOD PRESSURE: 124 MMHG

## 2018-12-11 VITALS — SYSTOLIC BLOOD PRESSURE: 138 MMHG | DIASTOLIC BLOOD PRESSURE: 58 MMHG

## 2018-12-11 VITALS — DIASTOLIC BLOOD PRESSURE: 43 MMHG | SYSTOLIC BLOOD PRESSURE: 117 MMHG

## 2018-12-11 VITALS — SYSTOLIC BLOOD PRESSURE: 104 MMHG | DIASTOLIC BLOOD PRESSURE: 40 MMHG

## 2018-12-11 VITALS — DIASTOLIC BLOOD PRESSURE: 52 MMHG | SYSTOLIC BLOOD PRESSURE: 135 MMHG

## 2018-12-11 VITALS — SYSTOLIC BLOOD PRESSURE: 132 MMHG | DIASTOLIC BLOOD PRESSURE: 55 MMHG

## 2018-12-11 VITALS — SYSTOLIC BLOOD PRESSURE: 138 MMHG | DIASTOLIC BLOOD PRESSURE: 68 MMHG

## 2018-12-11 VITALS — DIASTOLIC BLOOD PRESSURE: 58 MMHG | SYSTOLIC BLOOD PRESSURE: 141 MMHG

## 2018-12-11 VITALS — SYSTOLIC BLOOD PRESSURE: 103 MMHG | DIASTOLIC BLOOD PRESSURE: 40 MMHG

## 2018-12-11 VITALS — SYSTOLIC BLOOD PRESSURE: 135 MMHG | DIASTOLIC BLOOD PRESSURE: 65 MMHG

## 2018-12-11 VITALS — SYSTOLIC BLOOD PRESSURE: 130 MMHG | DIASTOLIC BLOOD PRESSURE: 45 MMHG

## 2018-12-11 VITALS — SYSTOLIC BLOOD PRESSURE: 111 MMHG | DIASTOLIC BLOOD PRESSURE: 42 MMHG

## 2018-12-11 VITALS — DIASTOLIC BLOOD PRESSURE: 37 MMHG | SYSTOLIC BLOOD PRESSURE: 102 MMHG

## 2018-12-11 VITALS — SYSTOLIC BLOOD PRESSURE: 107 MMHG | DIASTOLIC BLOOD PRESSURE: 42 MMHG

## 2018-12-11 VITALS — SYSTOLIC BLOOD PRESSURE: 100 MMHG | DIASTOLIC BLOOD PRESSURE: 38 MMHG

## 2018-12-11 VITALS — DIASTOLIC BLOOD PRESSURE: 54 MMHG | SYSTOLIC BLOOD PRESSURE: 133 MMHG

## 2018-12-11 VITALS — SYSTOLIC BLOOD PRESSURE: 117 MMHG | DIASTOLIC BLOOD PRESSURE: 47 MMHG

## 2018-12-11 VITALS — DIASTOLIC BLOOD PRESSURE: 43 MMHG | SYSTOLIC BLOOD PRESSURE: 105 MMHG

## 2018-12-11 VITALS — SYSTOLIC BLOOD PRESSURE: 107 MMHG | DIASTOLIC BLOOD PRESSURE: 40 MMHG

## 2018-12-11 VITALS — SYSTOLIC BLOOD PRESSURE: 141 MMHG | DIASTOLIC BLOOD PRESSURE: 46 MMHG

## 2018-12-11 VITALS — SYSTOLIC BLOOD PRESSURE: 133 MMHG | DIASTOLIC BLOOD PRESSURE: 65 MMHG

## 2018-12-11 VITALS — DIASTOLIC BLOOD PRESSURE: 50 MMHG | SYSTOLIC BLOOD PRESSURE: 138 MMHG

## 2018-12-11 VITALS — DIASTOLIC BLOOD PRESSURE: 47 MMHG | SYSTOLIC BLOOD PRESSURE: 127 MMHG

## 2018-12-11 VITALS — DIASTOLIC BLOOD PRESSURE: 58 MMHG | SYSTOLIC BLOOD PRESSURE: 106 MMHG

## 2018-12-11 VITALS — SYSTOLIC BLOOD PRESSURE: 136 MMHG | DIASTOLIC BLOOD PRESSURE: 48 MMHG

## 2018-12-11 VITALS — SYSTOLIC BLOOD PRESSURE: 104 MMHG | DIASTOLIC BLOOD PRESSURE: 42 MMHG

## 2018-12-11 VITALS — DIASTOLIC BLOOD PRESSURE: 40 MMHG | SYSTOLIC BLOOD PRESSURE: 107 MMHG

## 2018-12-11 VITALS — SYSTOLIC BLOOD PRESSURE: 132 MMHG | DIASTOLIC BLOOD PRESSURE: 48 MMHG

## 2018-12-11 VITALS — SYSTOLIC BLOOD PRESSURE: 124 MMHG | DIASTOLIC BLOOD PRESSURE: 58 MMHG

## 2018-12-11 VITALS — DIASTOLIC BLOOD PRESSURE: 39 MMHG | SYSTOLIC BLOOD PRESSURE: 96 MMHG

## 2018-12-11 VITALS — DIASTOLIC BLOOD PRESSURE: 67 MMHG | SYSTOLIC BLOOD PRESSURE: 135 MMHG

## 2018-12-11 VITALS — DIASTOLIC BLOOD PRESSURE: 45 MMHG | SYSTOLIC BLOOD PRESSURE: 124 MMHG

## 2018-12-11 VITALS — DIASTOLIC BLOOD PRESSURE: 40 MMHG | SYSTOLIC BLOOD PRESSURE: 108 MMHG

## 2018-12-11 VITALS — SYSTOLIC BLOOD PRESSURE: 104 MMHG | DIASTOLIC BLOOD PRESSURE: 39 MMHG

## 2018-12-11 VITALS — SYSTOLIC BLOOD PRESSURE: 137 MMHG | DIASTOLIC BLOOD PRESSURE: 57 MMHG

## 2018-12-11 VITALS — SYSTOLIC BLOOD PRESSURE: 128 MMHG | DIASTOLIC BLOOD PRESSURE: 48 MMHG

## 2018-12-11 VITALS — DIASTOLIC BLOOD PRESSURE: 44 MMHG | SYSTOLIC BLOOD PRESSURE: 116 MMHG

## 2018-12-11 VITALS — SYSTOLIC BLOOD PRESSURE: 120 MMHG | DIASTOLIC BLOOD PRESSURE: 43 MMHG

## 2018-12-11 VITALS — DIASTOLIC BLOOD PRESSURE: 56 MMHG | SYSTOLIC BLOOD PRESSURE: 140 MMHG

## 2018-12-11 VITALS — DIASTOLIC BLOOD PRESSURE: 52 MMHG | SYSTOLIC BLOOD PRESSURE: 126 MMHG

## 2018-12-11 VITALS — SYSTOLIC BLOOD PRESSURE: 108 MMHG | DIASTOLIC BLOOD PRESSURE: 40 MMHG

## 2018-12-11 VITALS — SYSTOLIC BLOOD PRESSURE: 127 MMHG | DIASTOLIC BLOOD PRESSURE: 50 MMHG

## 2018-12-11 DIAGNOSIS — I73.9: ICD-10-CM

## 2018-12-11 DIAGNOSIS — R91.1: ICD-10-CM

## 2018-12-11 DIAGNOSIS — E03.9: ICD-10-CM

## 2018-12-11 DIAGNOSIS — I65.21: Primary | ICD-10-CM

## 2018-12-11 DIAGNOSIS — I10: ICD-10-CM

## 2018-12-11 DIAGNOSIS — I25.10: ICD-10-CM

## 2018-12-11 DIAGNOSIS — E78.5: ICD-10-CM

## 2018-12-11 PROCEDURE — 03UK0JZ SUPPLEMENT RIGHT INTERNAL CAROTID ARTERY WITH SYNTHETIC SUBSTITUTE, OPEN APPROACH: ICD-10-PCS | Performed by: THORACIC SURGERY (CARDIOTHORACIC VASCULAR SURGERY)

## 2018-12-11 PROCEDURE — 03CK0ZZ EXTIRPATION OF MATTER FROM RIGHT INTERNAL CAROTID ARTERY, OPEN APPROACH: ICD-10-PCS | Performed by: THORACIC SURGERY (CARDIOTHORACIC VASCULAR SURGERY)

## 2018-12-12 VITALS — DIASTOLIC BLOOD PRESSURE: 40 MMHG | SYSTOLIC BLOOD PRESSURE: 102 MMHG

## 2018-12-12 VITALS — DIASTOLIC BLOOD PRESSURE: 57 MMHG | SYSTOLIC BLOOD PRESSURE: 120 MMHG

## 2018-12-12 VITALS — DIASTOLIC BLOOD PRESSURE: 41 MMHG | SYSTOLIC BLOOD PRESSURE: 103 MMHG

## 2018-12-12 VITALS — SYSTOLIC BLOOD PRESSURE: 111 MMHG | DIASTOLIC BLOOD PRESSURE: 43 MMHG

## 2018-12-12 VITALS — DIASTOLIC BLOOD PRESSURE: 42 MMHG | SYSTOLIC BLOOD PRESSURE: 107 MMHG

## 2018-12-12 VITALS — DIASTOLIC BLOOD PRESSURE: 58 MMHG | SYSTOLIC BLOOD PRESSURE: 126 MMHG

## 2018-12-12 VITALS — SYSTOLIC BLOOD PRESSURE: 138 MMHG | DIASTOLIC BLOOD PRESSURE: 55 MMHG

## 2018-12-12 VITALS — DIASTOLIC BLOOD PRESSURE: 56 MMHG | SYSTOLIC BLOOD PRESSURE: 132 MMHG

## 2018-12-12 VITALS — SYSTOLIC BLOOD PRESSURE: 135 MMHG | DIASTOLIC BLOOD PRESSURE: 65 MMHG

## 2018-12-12 VITALS — DIASTOLIC BLOOD PRESSURE: 46 MMHG | SYSTOLIC BLOOD PRESSURE: 112 MMHG

## 2018-12-12 VITALS — SYSTOLIC BLOOD PRESSURE: 108 MMHG | DIASTOLIC BLOOD PRESSURE: 44 MMHG

## 2018-12-12 VITALS — DIASTOLIC BLOOD PRESSURE: 43 MMHG | SYSTOLIC BLOOD PRESSURE: 111 MMHG

## 2018-12-12 VITALS — SYSTOLIC BLOOD PRESSURE: 128 MMHG | DIASTOLIC BLOOD PRESSURE: 50 MMHG

## 2018-12-12 VITALS — DIASTOLIC BLOOD PRESSURE: 52 MMHG | SYSTOLIC BLOOD PRESSURE: 105 MMHG

## 2018-12-12 VITALS — SYSTOLIC BLOOD PRESSURE: 99 MMHG | DIASTOLIC BLOOD PRESSURE: 44 MMHG

## 2018-12-12 VITALS — SYSTOLIC BLOOD PRESSURE: 124 MMHG | DIASTOLIC BLOOD PRESSURE: 53 MMHG

## 2018-12-12 VITALS — SYSTOLIC BLOOD PRESSURE: 131 MMHG | DIASTOLIC BLOOD PRESSURE: 60 MMHG

## 2018-12-12 VITALS — SYSTOLIC BLOOD PRESSURE: 118 MMHG | DIASTOLIC BLOOD PRESSURE: 53 MMHG

## 2018-12-12 VITALS — SYSTOLIC BLOOD PRESSURE: 113 MMHG | DIASTOLIC BLOOD PRESSURE: 45 MMHG

## 2018-12-12 VITALS — DIASTOLIC BLOOD PRESSURE: 45 MMHG | SYSTOLIC BLOOD PRESSURE: 112 MMHG

## 2018-12-12 VITALS — DIASTOLIC BLOOD PRESSURE: 58 MMHG | SYSTOLIC BLOOD PRESSURE: 130 MMHG

## 2018-12-12 VITALS — DIASTOLIC BLOOD PRESSURE: 53 MMHG | SYSTOLIC BLOOD PRESSURE: 129 MMHG

## 2018-12-12 VITALS — SYSTOLIC BLOOD PRESSURE: 135 MMHG | DIASTOLIC BLOOD PRESSURE: 55 MMHG

## 2018-12-12 VITALS — DIASTOLIC BLOOD PRESSURE: 58 MMHG | SYSTOLIC BLOOD PRESSURE: 106 MMHG

## 2018-12-12 VITALS — SYSTOLIC BLOOD PRESSURE: 115 MMHG | DIASTOLIC BLOOD PRESSURE: 45 MMHG

## 2018-12-12 VITALS — DIASTOLIC BLOOD PRESSURE: 55 MMHG | SYSTOLIC BLOOD PRESSURE: 116 MMHG

## 2018-12-12 VITALS — SYSTOLIC BLOOD PRESSURE: 131 MMHG | DIASTOLIC BLOOD PRESSURE: 69 MMHG

## 2018-12-12 VITALS — SYSTOLIC BLOOD PRESSURE: 125 MMHG | DIASTOLIC BLOOD PRESSURE: 65 MMHG

## 2018-12-12 VITALS — DIASTOLIC BLOOD PRESSURE: 47 MMHG | SYSTOLIC BLOOD PRESSURE: 113 MMHG

## 2018-12-12 VITALS — DIASTOLIC BLOOD PRESSURE: 64 MMHG | SYSTOLIC BLOOD PRESSURE: 172 MMHG

## 2018-12-12 VITALS — SYSTOLIC BLOOD PRESSURE: 128 MMHG | DIASTOLIC BLOOD PRESSURE: 59 MMHG

## 2018-12-12 VITALS — DIASTOLIC BLOOD PRESSURE: 44 MMHG | SYSTOLIC BLOOD PRESSURE: 99 MMHG

## 2018-12-12 VITALS — SYSTOLIC BLOOD PRESSURE: 140 MMHG | DIASTOLIC BLOOD PRESSURE: 62 MMHG

## 2018-12-12 VITALS — DIASTOLIC BLOOD PRESSURE: 53 MMHG | SYSTOLIC BLOOD PRESSURE: 121 MMHG

## 2018-12-12 VITALS — DIASTOLIC BLOOD PRESSURE: 51 MMHG | SYSTOLIC BLOOD PRESSURE: 121 MMHG

## 2018-12-12 VITALS — SYSTOLIC BLOOD PRESSURE: 115 MMHG | DIASTOLIC BLOOD PRESSURE: 52 MMHG

## 2018-12-12 VITALS — SYSTOLIC BLOOD PRESSURE: 108 MMHG | DIASTOLIC BLOOD PRESSURE: 43 MMHG

## 2018-12-12 VITALS — DIASTOLIC BLOOD PRESSURE: 51 MMHG | SYSTOLIC BLOOD PRESSURE: 126 MMHG

## 2018-12-12 VITALS — SYSTOLIC BLOOD PRESSURE: 129 MMHG | DIASTOLIC BLOOD PRESSURE: 59 MMHG

## 2018-12-12 VITALS — SYSTOLIC BLOOD PRESSURE: 104 MMHG | DIASTOLIC BLOOD PRESSURE: 44 MMHG

## 2018-12-12 VITALS — DIASTOLIC BLOOD PRESSURE: 52 MMHG | SYSTOLIC BLOOD PRESSURE: 122 MMHG

## 2018-12-12 VITALS — SYSTOLIC BLOOD PRESSURE: 118 MMHG | DIASTOLIC BLOOD PRESSURE: 54 MMHG

## 2018-12-12 VITALS — SYSTOLIC BLOOD PRESSURE: 125 MMHG | DIASTOLIC BLOOD PRESSURE: 50 MMHG

## 2018-12-12 VITALS — SYSTOLIC BLOOD PRESSURE: 125 MMHG | DIASTOLIC BLOOD PRESSURE: 54 MMHG

## 2018-12-12 VITALS — SYSTOLIC BLOOD PRESSURE: 103 MMHG | DIASTOLIC BLOOD PRESSURE: 52 MMHG

## 2018-12-12 VITALS — DIASTOLIC BLOOD PRESSURE: 49 MMHG | SYSTOLIC BLOOD PRESSURE: 130 MMHG

## 2018-12-12 VITALS — SYSTOLIC BLOOD PRESSURE: 119 MMHG | DIASTOLIC BLOOD PRESSURE: 50 MMHG

## 2018-12-12 VITALS — SYSTOLIC BLOOD PRESSURE: 124 MMHG | DIASTOLIC BLOOD PRESSURE: 59 MMHG

## 2018-12-12 VITALS — SYSTOLIC BLOOD PRESSURE: 123 MMHG | DIASTOLIC BLOOD PRESSURE: 61 MMHG

## 2018-12-12 VITALS — DIASTOLIC BLOOD PRESSURE: 61 MMHG | SYSTOLIC BLOOD PRESSURE: 122 MMHG

## 2018-12-12 VITALS — SYSTOLIC BLOOD PRESSURE: 114 MMHG | DIASTOLIC BLOOD PRESSURE: 45 MMHG

## 2018-12-12 VITALS — DIASTOLIC BLOOD PRESSURE: 62 MMHG | SYSTOLIC BLOOD PRESSURE: 164 MMHG

## 2018-12-12 VITALS — DIASTOLIC BLOOD PRESSURE: 61 MMHG | SYSTOLIC BLOOD PRESSURE: 133 MMHG

## 2018-12-12 VITALS — SYSTOLIC BLOOD PRESSURE: 101 MMHG | DIASTOLIC BLOOD PRESSURE: 56 MMHG

## 2018-12-12 VITALS — SYSTOLIC BLOOD PRESSURE: 133 MMHG | DIASTOLIC BLOOD PRESSURE: 58 MMHG

## 2018-12-12 VITALS — SYSTOLIC BLOOD PRESSURE: 158 MMHG | DIASTOLIC BLOOD PRESSURE: 68 MMHG

## 2018-12-12 VITALS — DIASTOLIC BLOOD PRESSURE: 58 MMHG | SYSTOLIC BLOOD PRESSURE: 113 MMHG

## 2018-12-12 VITALS — SYSTOLIC BLOOD PRESSURE: 120 MMHG | DIASTOLIC BLOOD PRESSURE: 50 MMHG

## 2018-12-12 VITALS — SYSTOLIC BLOOD PRESSURE: 144 MMHG | DIASTOLIC BLOOD PRESSURE: 56 MMHG

## 2018-12-12 VITALS — SYSTOLIC BLOOD PRESSURE: 140 MMHG | DIASTOLIC BLOOD PRESSURE: 54 MMHG

## 2018-12-12 VITALS — DIASTOLIC BLOOD PRESSURE: 55 MMHG | SYSTOLIC BLOOD PRESSURE: 108 MMHG

## 2018-12-12 VITALS — DIASTOLIC BLOOD PRESSURE: 49 MMHG | SYSTOLIC BLOOD PRESSURE: 116 MMHG

## 2018-12-12 VITALS — DIASTOLIC BLOOD PRESSURE: 72 MMHG | SYSTOLIC BLOOD PRESSURE: 162 MMHG

## 2018-12-12 VITALS — DIASTOLIC BLOOD PRESSURE: 57 MMHG | SYSTOLIC BLOOD PRESSURE: 133 MMHG

## 2018-12-12 VITALS — SYSTOLIC BLOOD PRESSURE: 121 MMHG | DIASTOLIC BLOOD PRESSURE: 48 MMHG

## 2018-12-12 VITALS — DIASTOLIC BLOOD PRESSURE: 44 MMHG | SYSTOLIC BLOOD PRESSURE: 104 MMHG

## 2018-12-12 VITALS — SYSTOLIC BLOOD PRESSURE: 136 MMHG | DIASTOLIC BLOOD PRESSURE: 52 MMHG

## 2018-12-12 VITALS — SYSTOLIC BLOOD PRESSURE: 115 MMHG | DIASTOLIC BLOOD PRESSURE: 59 MMHG

## 2018-12-12 VITALS — SYSTOLIC BLOOD PRESSURE: 113 MMHG | DIASTOLIC BLOOD PRESSURE: 43 MMHG

## 2018-12-12 VITALS — DIASTOLIC BLOOD PRESSURE: 66 MMHG | SYSTOLIC BLOOD PRESSURE: 117 MMHG

## 2018-12-12 VITALS — DIASTOLIC BLOOD PRESSURE: 70 MMHG | SYSTOLIC BLOOD PRESSURE: 138 MMHG

## 2018-12-12 VITALS — SYSTOLIC BLOOD PRESSURE: 113 MMHG | DIASTOLIC BLOOD PRESSURE: 47 MMHG

## 2018-12-12 VITALS — DIASTOLIC BLOOD PRESSURE: 55 MMHG | SYSTOLIC BLOOD PRESSURE: 105 MMHG

## 2018-12-12 VITALS — DIASTOLIC BLOOD PRESSURE: 52 MMHG | SYSTOLIC BLOOD PRESSURE: 125 MMHG

## 2018-12-12 VITALS — SYSTOLIC BLOOD PRESSURE: 132 MMHG | DIASTOLIC BLOOD PRESSURE: 54 MMHG

## 2018-12-13 VITALS — SYSTOLIC BLOOD PRESSURE: 122 MMHG | DIASTOLIC BLOOD PRESSURE: 57 MMHG

## 2018-12-13 VITALS — DIASTOLIC BLOOD PRESSURE: 57 MMHG | SYSTOLIC BLOOD PRESSURE: 119 MMHG

## 2018-12-13 VITALS — DIASTOLIC BLOOD PRESSURE: 58 MMHG | SYSTOLIC BLOOD PRESSURE: 114 MMHG

## 2018-12-13 VITALS — DIASTOLIC BLOOD PRESSURE: 61 MMHG | SYSTOLIC BLOOD PRESSURE: 109 MMHG

## 2018-12-13 VITALS — DIASTOLIC BLOOD PRESSURE: 66 MMHG | SYSTOLIC BLOOD PRESSURE: 129 MMHG

## 2018-12-13 VITALS — SYSTOLIC BLOOD PRESSURE: 124 MMHG | DIASTOLIC BLOOD PRESSURE: 58 MMHG

## 2018-12-13 VITALS — SYSTOLIC BLOOD PRESSURE: 124 MMHG | DIASTOLIC BLOOD PRESSURE: 73 MMHG

## 2018-12-13 VITALS — DIASTOLIC BLOOD PRESSURE: 56 MMHG | SYSTOLIC BLOOD PRESSURE: 113 MMHG

## 2018-12-13 VITALS — DIASTOLIC BLOOD PRESSURE: 64 MMHG | SYSTOLIC BLOOD PRESSURE: 128 MMHG

## 2018-12-13 VITALS — DIASTOLIC BLOOD PRESSURE: 61 MMHG | SYSTOLIC BLOOD PRESSURE: 126 MMHG

## 2018-12-13 VITALS — SYSTOLIC BLOOD PRESSURE: 124 MMHG | DIASTOLIC BLOOD PRESSURE: 60 MMHG

## 2018-12-13 VITALS — DIASTOLIC BLOOD PRESSURE: 60 MMHG | SYSTOLIC BLOOD PRESSURE: 122 MMHG

## 2018-12-13 VITALS — SYSTOLIC BLOOD PRESSURE: 119 MMHG | DIASTOLIC BLOOD PRESSURE: 56 MMHG

## 2018-12-13 VITALS — SYSTOLIC BLOOD PRESSURE: 129 MMHG | DIASTOLIC BLOOD PRESSURE: 68 MMHG

## 2018-12-13 VITALS — SYSTOLIC BLOOD PRESSURE: 128 MMHG | DIASTOLIC BLOOD PRESSURE: 57 MMHG

## 2018-12-13 VITALS — SYSTOLIC BLOOD PRESSURE: 122 MMHG | DIASTOLIC BLOOD PRESSURE: 59 MMHG

## 2018-12-13 VITALS — DIASTOLIC BLOOD PRESSURE: 59 MMHG | SYSTOLIC BLOOD PRESSURE: 130 MMHG

## 2018-12-13 VITALS — DIASTOLIC BLOOD PRESSURE: 61 MMHG | SYSTOLIC BLOOD PRESSURE: 142 MMHG

## 2018-12-13 VITALS — DIASTOLIC BLOOD PRESSURE: 62 MMHG | SYSTOLIC BLOOD PRESSURE: 132 MMHG

## 2018-12-13 VITALS — SYSTOLIC BLOOD PRESSURE: 125 MMHG | DIASTOLIC BLOOD PRESSURE: 57 MMHG

## 2018-12-13 VITALS — SYSTOLIC BLOOD PRESSURE: 126 MMHG | DIASTOLIC BLOOD PRESSURE: 67 MMHG

## 2018-12-13 VITALS — SYSTOLIC BLOOD PRESSURE: 116 MMHG | DIASTOLIC BLOOD PRESSURE: 63 MMHG

## 2018-12-13 VITALS — SYSTOLIC BLOOD PRESSURE: 129 MMHG | DIASTOLIC BLOOD PRESSURE: 67 MMHG

## 2018-12-13 VITALS — DIASTOLIC BLOOD PRESSURE: 63 MMHG | SYSTOLIC BLOOD PRESSURE: 119 MMHG

## 2018-12-13 VITALS — SYSTOLIC BLOOD PRESSURE: 128 MMHG | DIASTOLIC BLOOD PRESSURE: 60 MMHG

## 2018-12-13 VITALS — DIASTOLIC BLOOD PRESSURE: 70 MMHG | SYSTOLIC BLOOD PRESSURE: 130 MMHG

## 2018-12-13 VITALS — SYSTOLIC BLOOD PRESSURE: 135 MMHG | DIASTOLIC BLOOD PRESSURE: 58 MMHG

## 2018-12-13 VITALS — SYSTOLIC BLOOD PRESSURE: 128 MMHG | DIASTOLIC BLOOD PRESSURE: 58 MMHG

## 2018-12-13 VITALS — DIASTOLIC BLOOD PRESSURE: 63 MMHG | SYSTOLIC BLOOD PRESSURE: 121 MMHG

## 2018-12-13 VITALS — SYSTOLIC BLOOD PRESSURE: 114 MMHG | DIASTOLIC BLOOD PRESSURE: 55 MMHG

## 2018-12-13 VITALS — SYSTOLIC BLOOD PRESSURE: 129 MMHG | DIASTOLIC BLOOD PRESSURE: 61 MMHG

## 2018-12-13 VITALS — DIASTOLIC BLOOD PRESSURE: 60 MMHG | SYSTOLIC BLOOD PRESSURE: 121 MMHG

## 2018-12-13 VITALS — SYSTOLIC BLOOD PRESSURE: 115 MMHG | DIASTOLIC BLOOD PRESSURE: 53 MMHG

## 2018-12-13 VITALS — SYSTOLIC BLOOD PRESSURE: 114 MMHG | DIASTOLIC BLOOD PRESSURE: 61 MMHG

## 2018-12-13 VITALS — DIASTOLIC BLOOD PRESSURE: 66 MMHG | SYSTOLIC BLOOD PRESSURE: 134 MMHG

## 2018-12-13 VITALS — DIASTOLIC BLOOD PRESSURE: 55 MMHG | SYSTOLIC BLOOD PRESSURE: 114 MMHG

## 2018-12-13 VITALS — SYSTOLIC BLOOD PRESSURE: 124 MMHG | DIASTOLIC BLOOD PRESSURE: 64 MMHG

## 2019-09-23 ENCOUNTER — HOSPITAL ENCOUNTER (OUTPATIENT)
Dept: HOSPITAL 84 - D.RT | Age: 74
Discharge: HOME | End: 2019-09-23
Attending: FAMILY MEDICINE
Payer: MEDICARE

## 2019-09-23 VITALS — BODY MASS INDEX: 31.4 KG/M2

## 2019-09-23 DIAGNOSIS — R06.00: Primary | ICD-10-CM

## 2019-10-11 ENCOUNTER — HOSPITAL ENCOUNTER (OUTPATIENT)
Dept: HOSPITAL 84 - D.CATH | Age: 74
Discharge: HOME | End: 2019-10-11
Attending: INTERNAL MEDICINE
Payer: MEDICARE

## 2019-10-11 VITALS — WEIGHT: 160.34 LBS | HEIGHT: 64 IN | BODY MASS INDEX: 27.37 KG/M2

## 2019-10-11 VITALS — DIASTOLIC BLOOD PRESSURE: 60 MMHG | SYSTOLIC BLOOD PRESSURE: 135 MMHG

## 2019-10-11 DIAGNOSIS — R06.02: ICD-10-CM

## 2019-10-11 DIAGNOSIS — I35.9: Primary | ICD-10-CM

## 2019-10-11 DIAGNOSIS — Z95.1: ICD-10-CM

## 2019-10-11 DIAGNOSIS — I25.10: ICD-10-CM

## 2019-10-11 LAB
ALT SERPL-CCNC: 28 U/L (ref 10–68)
ANION GAP SERPL CALC-SCNC: 12.6 MMOL/L (ref 8–16)
BASOPHILS NFR BLD AUTO: 0.4 % (ref 0–2)
BUN SERPL-MCNC: 24 MG/DL (ref 7–18)
CALCIUM SERPL-MCNC: 9 MG/DL (ref 8.5–10.1)
CHLORIDE SERPL-SCNC: 105 MMOL/L (ref 98–107)
CHOLEST/HDLC SERPL: 3.1 RATIO (ref 2.3–4.1)
CO2 SERPL-SCNC: 28 MMOL/L (ref 21–32)
CREAT SERPL-MCNC: 0.9 MG/DL (ref 0.6–1.3)
EOSINOPHIL NFR BLD: 2.3 % (ref 0–7)
ERYTHROCYTE [DISTWIDTH] IN BLOOD BY AUTOMATED COUNT: 13.8 % (ref 11.5–14.5)
GLUCOSE SERPL-MCNC: 93 MG/DL (ref 74–106)
HCT VFR BLD CALC: 32.9 % (ref 36–48)
HDLC SERPL-MCNC: 52 MG/DL (ref 32–96)
HGB BLD-MCNC: 11.2 G/DL (ref 12–16)
IMM GRANULOCYTES NFR BLD: 0.2 % (ref 0–5)
LDL-HDL RATIO: 1.8 RATIO (ref 1.5–3.5)
LDLC SERPL-MCNC: 94 MG/DL (ref 0–100)
LYMPHOCYTES NFR BLD AUTO: 27.1 % (ref 15–50)
MCH RBC QN AUTO: 33.3 PG (ref 26–34)
MCHC RBC AUTO-ENTMCNC: 34 G/DL (ref 31–37)
MCV RBC: 97.9 FL (ref 80–100)
MONOCYTES NFR BLD: 6.2 % (ref 2–11)
NEUTROPHILS NFR BLD AUTO: 63.8 % (ref 40–80)
OSMOLALITY SERPL CALC.SUM OF ELEC: 284 MOSM/KG (ref 275–300)
PLATELET # BLD: 230 10X3/UL (ref 130–400)
PMV BLD AUTO: 10.3 FL (ref 7.4–10.4)
POTASSIUM SERPL-SCNC: 4.6 MMOL/L (ref 3.5–5.1)
RBC # BLD AUTO: 3.36 10X6/UL (ref 4–5.4)
SODIUM SERPL-SCNC: 141 MMOL/L (ref 136–145)
TRIGL SERPL-MCNC: 78 MG/DL (ref 30–200)
WBC # BLD AUTO: 5.7 10X3/UL (ref 4.8–10.8)

## 2019-10-11 NOTE — HEMODYNAMI
PATIENT:MAYRA MARTINEZ                                MEDICAL RECORD: [id number]
: 49                                            LOCATION:D.CAT          
ACCT# [id number]                                       ADMISSION DATE: 10/11/19
 
 
 Generatedon:10/11/431344:45
Patient name: MAYRA MARTINEZ Patient #: [id number] Visit #: [id number] SSN: 4
81-
: 1945 Date of study: 10/11/2019
Page: Of
Hemodynamic Procedure Report
****************************
Patient Data
Patient Demographics
Procedure consent was obtained
First Name: MAYRA        Gender: Female
Last Name: MIHCELLE           : 1945
Middle Initial: J           Age: 74 year(s)
Patient #: [id number]       Race: 
Visit #: [id number]
SSN: 
Accession #:
[id number]
Additional ID: [id number]
Contact details
Address: [address] [place] [address]       Phone: [phone]
State: [place]
City: [facility]
Zip code: [address]
Past Medical History
Allergies
Allergen        Reaction        Date         Comments
Reported
Other allergy                   2017    Ace Inhibitors
Other allergy                   2018    ACE Inhibitors
Other allergy                   10/11/2019   ace inhibitors
Admission
Admission Data
Admission Date: 10/11/2019  Admission Time: 10:30
Lab Results
Lab Result Date: 10/11/2019 Lab Result Time: 0:00
Biochemistry
Name         Units    Result                Min      Max
BUN          mg/dl    24       --(----)-*   7        18
Creatinine   mg/dl    0.9      --(-*--)--   0.6      1.3
eGFR         ml/min   64.97145 *-(----)--   90       120
NONAFRICAN
CBC
Name         Units    Result                Min      Max
Hemoglobin   g/dl     11.2     *-(----)--   13.5     17.5
Procedure
Procedure Types
Cath Procedure
Diagnostic Procedure
LHC
Coronaries w/Grafts
Aortic Root Angiography
Sedation Charges
Moderate Sedation up to 15 minutes
 
Procedure Description
Procedure Date
Procedure Date: 10/11/2019
Procedure Start Time: 13:12
Procedure End Time: 13:41
Procedure Staff
Name                            Function
Beatriz Shane RT                    Monitor
Sallie Pittman RN                  Nurse
Tramaine Lopez MD                   Performing Physician
Tarsha Aguilar RT              Monitor
Clara Swanson RT                Scrub
Indication
CABG
Procedure Data
Cath Procedure
Fluoroscopy
Diagnostic fluoroscopy      Total fluoroscopy Time: 6.4
time: 6.4 min               min
Diagnostic fluoroscopy      Total fluoroscopy dose: 558
dose: 558 mGy               mGy
Contrast Material
Contrast Material Type                       Amount (ml)
Isovue 300                                   105
Entry Location
Entry     Primary  Successful  Side  Size  Upsize Upsize Entry    Closure Succes
sful  Closure
Location                             (Fr)  1 (Fr) 2 (Fr) Remarks  Device        
      Remarks
Femoral                        Right 5 Fr                         Exoseal
artery
Estimated blood loss: 5 ml
Diagnostic catheters
Device Type               Used For           End Catheter
Placement
MULTIPACK JL 4.0 5Fr      Left Coronary
catheter                  Angiography
DIAGNOSTIC AR MOD 5Fr     Procedure
Catheter (625583W)
DIAGNOSTIC IMT 5Fr        Procedure
Catheter (232808424)
MULTIPACK Pigtail 5 Fr    Procedure
catheter
DIAGNOSTIC AR MOD 5Fr     Procedure
Catheter (310922S)
Procedure Complications
No complications
Procedure Medications
Medication           Administration Route Dosage
Oxygen               etCO2 Nasal cannula  2 l/min
Lidocaine 2%         added to field       20
Heparin Flush Bag    added to field       2 bags
(1000units/500ml NS)
0.9% NaCl            I.V.                 100 ml/hr
Versed               I.V.                 1 mg
Versed               I.V.                 1 mg
Fentanyl             I.V.                 50 mcg
Fentanyl             I.V.                 50 mcg
Versed               I.V.                 1 mg
Versed               I.V.                 1 mg
 
Hemodynamics
Rest
HGB: 11.2 (g/dl) Heart Rate: 67 (bpm)
Pressure Samples
Time     Site     Value (mmHg) Purpose      Heart      Use
Rate(bpm)
13:29    LV       185/1,24     Snapshot     85
13:30    LV       193/1,26     Pullback     75
13:30    AO       152/57(98)   Pullback     75
Gradients
Valve  Time  Site 1   Site 2     Mean    SEP/DFP    Peak To Heart  Use
(mmHg)  (sec/min)  Peak    Rate
(mmHg)  (bpm)
Aortic 13:30 LV       AO         38      24         41      75
193/1,26 152/57(98)
Calculations
Valve    P-P      Mean      Valve     Index  Valve    Source
Name              Gradient  Area             Flow
(cm2)
Aortic   41       38
41       38
Snapshots
Pre Cath      Intra         NCS           Post Cath
Vital Signs
Time     Heart  Resp   SPO2 etCO2   NIBP (mmHg) Rhythm  Pain    Sedation
Rate   (ipm)  (%)  (mmHg)                      Status  Level
(bpm)
12:59:40 71     16     100  33.1    144/68(115) NSR     0 (11)  10(A)
, No
pain
13:03:59 62     14     98   35.4    126/53(91)  NSR     0 (11)  10(A)
, No
pain
13:08:18 68     16     94   39.9    117/56(92)  NSR     0 (11)  10(A)
, No
pain
13:12:32 75     14     95   36.9    135/70(113) NSR     0 (11)  9(A)
, No
pain
13:16:54 79     16     94   27.1    147/68(103) NSR     0 (11)  9(A)
, No
pain
13:21:18 82     18     96   36.9    134/68(103) NSR     0 (11)  9(A)
, No
pain
13:25:42 82     17     97   36.1    141/67(104) NSR     0 (11)  9(A)
, No
pain
13:29:58 76     12     98   37.6    128/75(92)  NSR     0 (11)  10(A)
, No
pain
13:34:16 80     13     97   39.9    148/70(108) NSR     0 (11)  10(A)
, No
pain
13:38:39 79     14     95   34.6    147/72(110) NSR     0 (11)  10(A)
, No
pain
Medications
Time     Medication       Route   Dose  Verified Delivered Reason    Notes  Effe
ctiveness
 
by       by
13:05:30 Oxygen           etCO2   2     Tramaine     Buffie    used for
Nasal   l/min John Pittman RN   procedure
cannula
13:05:37 Lidocaine 2%     added   20ml  Tramaine     Tramaine      used for
to      vial  John Lopez MD  procedure
field
13:05:44 Heparin Flush    added   2     Tramaine     Tramaine      used for
Bag              to      bags  John Lopez MD  procedure
(1000units/500ml field
NS)
13:05:53 0.9% NaCl        I.V.    100   Tramaine     Buffie    Per
ml/hr John Pittman RN   physician
13:06:34 Versed           I.V.    1 mg  Tramaine     Buffie    for
John Pittman RN   sedation
13:06:43 Fentanyl         I.V.    50    Tramaine     Buffie    for
mcg   John Pittman RN   sedation
13:10:27 Fentanyl         I.V.    50    Tramaine     Buffie    for
mcg   John Pittman RN   sedation
13:10:37 Versed           I.V.    1 mg  Tramaine     Buffie    for
Lopez MD Pittman RN   sedation
13:21:28 Versed           I.V.    1 mg  Tramainelucian Rizo    for
John Pittman RN   sedation
13:28:50 Versed           I.V.    1 mg  Tramainelucian Rizo    for
John Pittman RN   sedation
Procedure Log
Time     Note
12:50:18 Sallie Pittman RN sent for patient. Start room use.
12:54:41 Diagnostic Cath Status : Elective
12:55:57 Indication : CABG
12:56:13 Procedure Status Elective Heart Cath (OP).
12:56:22 Time tracking: Regular hours (M-F 7:00 - 5:00)
12:56:29 Plan of Care:Hemodynamics will remain stable., Cardiac rhythm will
remain stable., Comfort level will be maintained., Respiratory function
will remain adequate., Patient/ family verbilizes understanding of
procedure., Procedure tolerated without complication., Recovers from
procedure without complications..
12:56:36 Patient received from Pre/Post Procedure Room to CCL 1 Alert and
oriented. Tansferred to table in Supine position.
12:57:03 Signed procedure consent form obtained from patient.
12:57:04 Warm blankets applied, and shira hugger turned on for patient comfort.
12:57:05 Correct patient and procedure confirmed by team.
12:57:07 ECG and BP/O2 sat monitors applied to patient.
12:57:20 H&P Date Dictated: 10/11/2019 H&P Addendum completed by physician on da
y
of procedure. (MUST COMPLETE FOR ALL OUTPATIENTS), New H&P dictated by
physician..
12:57:22 Pre-procedure instructions explained to patient.
12:57:23 Pre-op teaching completed and patient verbalized understanding.
12:57:28 Family in waiting room.
12:57:31 Patient NPO since Midnight.
12:57:57 Patient allergic to Other allergyace inhibitors
12:58:10 Vital chart was started
13:01:01 Lab Result : eGFR NONAFRICAN 64.41173 ml/min
13:01:01 Lab Result : Hemoglobin 11.2 g/dl
13:01:01 Lab Result : BUN 24 mg/dl
13:01:01 Lab Result : Creatinine 0.9 mg/dl
13:02:21 Baseline sample Acquired.
13:02:29 Rhythm: sinus rhythm
13:02:31 Full Disclosure recording started
 
13:02:41 Is the patient allergic to Iodine/contrast media? No.
13:02:52 Is patient on blood thinner?Yes
13:02:58 **ACC** The patient was administered the following blood thiners within
the last 24 hours: **ACC**Plavix
13:03:02 Patient diabetic? No.
13:03:05 ----Pre-sedation anethsthesia assessment.----
13:03:10 Previous problem with sedation/anesthesia? No ?
13:03:12 Snore? Yes
13:03:14 Sleep apnea? No
13:03:16 Deviated septum? No
13:03:18 Opens mouth fully? Yes
13:03:20 Sticks out tongue? Yes
13:03:25 Airway obstruction? No ?
13:03:29 Dentures? No ?
13:03:31 -----------------------------------------------------------------------
-
13:03:38 Pre procedure: right dorsailis pedis pulse Doppler
13:03:42 Pre procedure: left dorsailis pedis pulse Doppler
13:03:48 Patient pain scale 0/10 ?.
13:04:00 IV patent on arrival in left forearm with 0.9% NaCl at [facility].
13:04:07 Lab results completed and on chart.
13:04:11 Right groin area was prepped with chlora-prep and draped in sterile
fashion
13:04:13 Alarms reviewed by R. N.
13:04:14 Sharps counted by scrub and verified by R.N.
13:04:15 Physician arrived
13:04:16 --------ALL STOP TIME OUT------
13:04:17 Final Timeout: patient, procedure, and site verified with staff and
physician. All members of the team are in agreement.
13:04:20 Right groin site verified by team.
13:04:27 Fire Safety Assessment: A--An alcohol-based skin anteseptic being used
preoperatively., C--Open oxygen or nitrous oxide is being used., D--An
ESU, laser, or fiber-optic light is being used.
13:04:31 Physical assessment completed. ASA score P 2 - A patient with mild
systemic disease as per Tramaine Lopez MD.
13:04:51 2) 60-89 Mildly reduced kidney function, and other findings (as for
stage 1) point to kidney disease.
13:04:57 Maximum allowable contrast dose (3.7 X eGFR X 0.75)180 ml.
13:05:04 Sedation plan: IV Moderate Sedation Medication:Versed, Fentanyl
13:05:14 Use device set Femoral Dx
13:05:16 ACIST Syringe (64090) opened to sterile field.
13:05:17 Bag Decanter (2002S) opened to sterile field.
13:05:18 Medline Cath Pack ([id number]) opened to sterile field.
13:05:20 ACIST Hand Control (80214) opened to sterile field.
13:05:21 ACIST Manifold (21402) opened to sterile field.
13:05:23 DIAGNOSTIC Multipack 5Fr catheter set (YJ2178) opened to sterile field.
13:05:24 Tegaderm 4 x 4 (1626W) opened to sterile field.
13:05:26 SHEATH 5FR [place] (EZF185) opened to sterile field.
13:05:27 EMERALD Guide Wire (010-623) opened to sterile field.
13:05:30 Oxygen 2 l/min etCO2 Nasal cannula was administered by Sallie Pittman RN;
used for procedure; Verbal order read back and verified.
13:05:37 Lidocaine 2% 20ml vial added to field was administered by Tramaine Lopez MD
;
used for procedure; Verbal order read back and verified.
13:05:44 Heparin Flush Bag (1000units/500ml NS) 2 bags added to field was
administered by Tramaine Lopez MD; used for procedure; Verbal order read
back and verified.
13:05:53 0.9% NaCl 100 ml/hr I.V. was administered by Sallie Pittman RN; Per
physician; Verbal order read back and verified.
13:06:34 Versed 1 mg I.V. was administered by Sallie Pittman RN; for sedation;
 
Verbal order read back and verified.
13:06:43 Fentanyl 50 mcg I.V. was administered by Sallie Pittman RN; for sedation;
Verbal order read back and verified.
13:08:46 Zero performed for pressure channel P1
13:08:57 Zero performed for pressure channel P1
13:09:09 Zero performed for pressure channel P1
13:10:27 Fentanyl 50 mcg I.V. was administered by Sallie Pittman RN; for sedation;
Verbal order read back and verified.
13:10:37 Versed 1 mg I.V. was administered by Sallie Pittman RN; for sedation;
Verbal order read back and verified.
13:12:47 Procedure started.
13:12:52 Local anesthetic to right femoral artery with Lidocaine 2% by Tramaine black MD.**INITIAL ACCESS ONLY**
13:14:05 **ACC** Patient presents with Stable Angina CCS Anginal Class 2--Slight
limitation of ordinary activity.
13:14:39 **ACC**Patient has been prescribed/administered the following
anti-anginal medication within the last 2 weeks: None
13:15:10 A 5 Fr sheath was inserted into the Right Femoral artery
13:15:23 A MULTIPACK JL 4.0 5Fr catheter was advanced over the wire and used for
Left Coronary Angiography.
13:15:46 LCA angiography performed.
13:15:52 Injector settings: Ml/sec: 3, Volume: 6,
13:16:43 Catheter removed.
13:17:11 A DIAGNOSTIC AR MOD 5Fr Catheter ([id number]) was advanced over the wire
and used for Procedure.
13:18:28 RCA angiography performed.
13:18:47 Injector settings: Ml/sec: 3, Volume: 6,
13:19:11 SVG to OM angiography performed.
13:19:53 SVG to RCA angiography performed.
13:20:27 Catheter removed.
13:20:48 A DIAGNOSTIC IMT 5Fr Catheter ([id number]) was advanced over the wire an
d
used for Procedure.
13:21:28 Versed 1 mg I.V. was administered by Sallie Pittman RN; for sedation;
Verbal order read back and verified.
13:23:17 LIMA to LAD angiography performed.
13:23:35 Catheter removed.
13:24:04 A MULTIPACK Pigtail 5 Fr catheter was advanced over the wire and used
for Procedure.
13:24:57 Aortic Root visualized
13:25:09 Injector settings: Ml/sec: 15, Volume: 30,
13:27:25 Catheter removed.
13:28:00 A DIAGNOSTIC AR MOD 5Fr Catheter (355059S) was advanced over the wire
and used for Procedure.
13:28:42 BENTSON 260 wire (D70371) opened to sterile field.
13:28:50 Versed 1 mg I.V. was administered by Sallie Pittman RN; for sedation;
Verbal order read back and verified.
13:30:31 LV hemodynamics recorded.
13:31:04 EF : 40 %
13:31:29 Catheter removed.
13:31:38 EXOSEAL 5Fr () opened to sterile field.
13:31:59 Sheath removed intact; hemostasis achieved with Exoseal to the Right
Femoral artery.
13:32:17 Procedure ended.(Physican Out)
13:32:42 Fluoroscopy time 06.40 minutes.
13:32:52 Fluoroscopy dose: 558 mGy
13:32:52 Flurop Dose total: 558
13:33:04 Dose Area Product 31789 mGy/cm.
13:33:23 Contrast amount:Isovue 300 105ml.
 
13:33:30 Maximum allowable dose exceeded? No.
13:33:32 Sharps counted by scrub and verified by R.N.
13:33:55 Insertion/operative site no bleeding no hematoma.
13:34:21 Post-op/insertion site Right Femoral artery dressed using a 4 x 4 and
Tegaderm.
13:34:28 Post right femoral artery:stable
13:34:31 Post Procedure Pulses reassessed and unchanged
13:34:34 Post Procedure Pulses reassessed and unchanged
13:34:44 Post-procedure physical assessment completed. ASA score P 2 - A patient
with mild systemic disease as per Tramaine Lopez MD.
13:34:51 Post procedure rhythm: unchanged.
13:34:55 Estimated blood loss: 5 ml
13:34:58 Post procedure instruction explained to patient.Patient verbalizes
understanding.
13:34:59 Patient needs reinforcement of post procedure teaching.
13:35:44 Procedure type changed to Cath procedure, Diagnostic procedure, LHC,
Coronaries w/Grafts, Aortic Root Angiography, Sedation Charges, Moderate
Sedation up to 15 minutes
13:37:15 Procedure and supply charges have been captured, reviewed, submitted an
d
are correct.
13:40:10 Procedure Complication : No complications
13:40:18 Vital chart was stopped
13:40:49 [facility] Findings: MVD- MD will discuss options w/ pt
13:40:56 Operative report dictated upon procedure completion.
13:40:58 See physician's report for complete and final results.
13:41:22 Report given to Pre/Post Procedure Room.
13:41:26 Patient transfered to Pre/Post Procedure Room with Stretcher.
13:41:29 Full Disclosure recording stopped
13:41:29 Procedure ended.
13:41:34 End room use (Document Last)
Device Usage
Item Name   Manufacture  Quantity  Catalog Number Hospital Part    Current Minim
al Lot# /
Charge   Number  Stock   Stock   Serial#
Code
ACIST       Acist        1         84553          903349   287576  794146  20
Syringe     Medical
(96146)     Systems Inc
Bag         Microtek     1                   317626   50564   393033  5
Decanter    Medical Inc.
()
Medline     Medline      1         [id number]      317402   09246   717695  5
Cath Pack
([id number])
ACIST Hand  Acist        1         07054          364146   411545  311099  5
Control     Medical
(46425)     Systems Inc
ACIST       Acist        1         20375          888655   637164  945342  5
Manifold    Medical
(91942)     Systems Inc
DIAGNOSTIC  Cardinal     1         IS9797         367323   21245   722366  30
Multipack   Health
5Fr
catheter
set
([id number])
Tegaderm 4  3M           1         1626W          941301   126730  912744  5
x 4 (1626W)
SHEATH 5FR  Terumo       1         FBG253         414987   479249  141258  5
 
[place]
(MAW209)
EMERALD     Cardinal     1         502-455        874552   334547  803210  5
Guide Wire  Health
(502-455)
MULTIPACK   Cardinal     1                                         420806  5
JL 4.0 5Fr  Health
catheter
DIAGNOSTIC  Cardinal     1         913984J        901157   627453  027833  15
AR MOD 5Fr  Health
Catheter
(813548V)
DIAGNOSTIC  [place]       1         [id number]  726933   080336  77243   5
IMT 5Fr     Scientific
Catheter
(738221502)
MULTIPACK   Cardinal     1                                         573493  5
Pigtail 5   Health
Fr catheter
BENTSON 260 [employer] 1         D41192         980625   879204  461234  5
wire
(T22451)
EXOSEAL 5Fr Cardinal     1                   682776   758437  278989  10
()     Health
Signature Audit [place]
Stage           Time        Signature      Unsigned
Intra-Procedure 10/11/2019  Tarsha
1:42:04 PM  Lauren
RT(R) (CV)
Intra-Procedure 10/11/2019  Sallie Pittman RN
1:42:33 PM
Intra-Procedure 10/11/2019  Tramaine Lopez MD
1:44:56 PM
 
 
 
 
 
 
 
 
 
 
 
 
 
 
 
 
 
 
 
 
 
 
[facility]                                          
[address] [place] [address]

## 2019-10-11 NOTE — NUR
1630 PT HAS AMBULATED TO THE [facility] AND VOIDED QS. DENIES ANY C/O
PAIN OR NAUSEA. DC INSTRUCTIONS REVIEWED WITH PT AND FRIEND WHO
VERBALIZE UNDERSTANDING. PT ESCORTED TO PRIVATE AUTO VIA WC BY NURSE
WITH FRIEND DRIVING HER HOME. PT HAS ALL PERSONAL BELONGINGS AND DC
INSTRUCTIONS AT TIME OF DISCHARGE, IS ALERT AND DENIES ANY C/O.

## 2019-10-11 NOTE — NUR
1530 HOB FULLY ELEVATED. PT HAS TAMELA 100% OF SANDWICH AND PO FLUIDS.
DRESSING REMAINS CDI, PEDAL PULSES PALPABLE. VSS, PT DENIES ANY C/O.
FRIEND AT BEDSIDE.
1610 DRESSING REMAINS CDI, PT IS ALERT AND DENIES ANY C/O. IV DC'D
WITH CATH INTACT AND PT IS DRESSING FOR DC WITH ASSIST.

## 2019-10-11 NOTE — NUR
DRESSING TO RIGHT GROIN IS CDI, AREA IS SOFT AND NONTENDER. PEDAL
PULSES PALPABLE. PT DENIES ANY C/O. HOB ELEVATED 30 DEGREES, SANDWICH
AND PO FLUIDS SERVED.

## 2019-11-20 ENCOUNTER — HOSPITAL ENCOUNTER (EMERGENCY)
Dept: HOSPITAL 84 - D.ER | Age: 74
Discharge: PSYCHIATRIC OR SUBSTANCE USE FACILITY | End: 2019-11-20
Payer: MEDICARE

## 2019-11-20 VITALS — HEIGHT: 64 IN | WEIGHT: 157.33 LBS | BODY MASS INDEX: 26.86 KG/M2

## 2019-11-20 VITALS — DIASTOLIC BLOOD PRESSURE: 76 MMHG | SYSTOLIC BLOOD PRESSURE: 185 MMHG

## 2019-11-20 DIAGNOSIS — M54.9: ICD-10-CM

## 2019-11-20 DIAGNOSIS — I25.10: ICD-10-CM

## 2019-11-20 DIAGNOSIS — I63.9: Primary | ICD-10-CM

## 2019-11-20 DIAGNOSIS — Z95.1: ICD-10-CM

## 2019-11-20 DIAGNOSIS — I10: ICD-10-CM

## 2019-11-20 DIAGNOSIS — F32.9: ICD-10-CM

## 2019-11-20 DIAGNOSIS — I25.2: ICD-10-CM

## 2019-11-20 DIAGNOSIS — Z95.5: ICD-10-CM

## 2019-11-20 DIAGNOSIS — K21.9: ICD-10-CM

## 2019-11-20 LAB
ALBUMIN SERPL-MCNC: 3.9 G/DL (ref 3.4–5)
ALP SERPL-CCNC: 101 U/L (ref 46–116)
ALT SERPL-CCNC: 28 U/L (ref 10–68)
ANION GAP SERPL CALC-SCNC: 11.8 MMOL/L (ref 8–16)
APTT BLD: 27 SECONDS (ref 22.8–39.4)
BASOPHILS NFR BLD AUTO: 0.4 % (ref 0–2)
BILIRUB SERPL-MCNC: 0.59 MG/DL (ref 0.2–1.3)
BUN SERPL-MCNC: 21 MG/DL (ref 7–18)
CALCIUM SERPL-MCNC: 9.3 MG/DL (ref 8.5–10.1)
CHLORIDE SERPL-SCNC: 106 MMOL/L (ref 98–107)
CK MB SERPL-MCNC: 0.4 U/L (ref 0–3.6)
CK SERPL-CCNC: 23 UL (ref 21–215)
CO2 SERPL-SCNC: 26.7 MMOL/L (ref 21–32)
CREAT SERPL-MCNC: 0.9 MG/DL (ref 0.6–1.3)
EOSINOPHIL NFR BLD: 2.7 % (ref 0–7)
ERYTHROCYTE [DISTWIDTH] IN BLOOD BY AUTOMATED COUNT: 14 % (ref 11.5–14.5)
GLOBULIN SER-MCNC: 3.8 G/L
GLUCOSE SERPL-MCNC: 90 MG/DL (ref 74–106)
HCT VFR BLD CALC: 36 % (ref 36–48)
HGB BLD-MCNC: 12 G/DL (ref 12–16)
IMM GRANULOCYTES NFR BLD: 0.2 % (ref 0–5)
INR PPP: 1.06 (ref 0.85–1.17)
LYMPHOCYTES NFR BLD AUTO: 29.2 % (ref 15–50)
MAGNESIUM SERPL-MCNC: 2 MG/DL (ref 1.8–2.4)
MCH RBC QN AUTO: 33.1 PG (ref 26–34)
MCHC RBC AUTO-ENTMCNC: 33.3 G/DL (ref 31–37)
MCV RBC: 99.2 FL (ref 80–100)
MONOCYTES NFR BLD: 6.5 % (ref 2–11)
NEUTROPHILS NFR BLD AUTO: 61 % (ref 40–80)
OSMOLALITY SERPL CALC.SUM OF ELEC: 281 MOSM/KG (ref 275–300)
PLATELET # BLD: 223 10X3/UL (ref 130–400)
PMV BLD AUTO: 10.8 FL (ref 7.4–10.4)
POTASSIUM SERPL-SCNC: 4.5 MMOL/L (ref 3.5–5.1)
PROT SERPL-MCNC: 7.7 G/DL (ref 6.4–8.2)
PROTHROMBIN TIME: 13.3 SECONDS (ref 11.6–15)
RBC # BLD AUTO: 3.63 10X6/UL (ref 4–5.4)
SODIUM SERPL-SCNC: 140 MMOL/L (ref 136–145)
TROPONIN I SERPL-MCNC: 0.02 NG/ML (ref 0–0.06)
TSH SERPL-ACNC: 2.06 UIU/ML (ref 0.36–3.74)
WBC # BLD AUTO: 5.2 10X3/UL (ref 4.8–10.8)

## 2019-12-24 ENCOUNTER — HOSPITAL ENCOUNTER (OUTPATIENT)
Dept: HOSPITAL 84 - D.ER | Age: 74
Setting detail: OBSERVATION
LOS: 2 days | Discharge: HOME | End: 2019-12-26
Attending: FAMILY MEDICINE | Admitting: FAMILY MEDICINE
Payer: MEDICARE

## 2019-12-24 VITALS
HEIGHT: 64 IN | WEIGHT: 155.33 LBS | BODY MASS INDEX: 26.52 KG/M2 | HEIGHT: 64 IN | WEIGHT: 155.33 LBS | BODY MASS INDEX: 26.52 KG/M2

## 2019-12-24 VITALS — DIASTOLIC BLOOD PRESSURE: 68 MMHG | SYSTOLIC BLOOD PRESSURE: 140 MMHG

## 2019-12-24 VITALS — DIASTOLIC BLOOD PRESSURE: 52 MMHG | SYSTOLIC BLOOD PRESSURE: 127 MMHG

## 2019-12-24 VITALS — SYSTOLIC BLOOD PRESSURE: 137 MMHG | DIASTOLIC BLOOD PRESSURE: 65 MMHG

## 2019-12-24 VITALS — SYSTOLIC BLOOD PRESSURE: 157 MMHG | DIASTOLIC BLOOD PRESSURE: 62 MMHG

## 2019-12-24 DIAGNOSIS — E03.9: ICD-10-CM

## 2019-12-24 DIAGNOSIS — D64.9: ICD-10-CM

## 2019-12-24 DIAGNOSIS — E86.0: ICD-10-CM

## 2019-12-24 DIAGNOSIS — M19.90: ICD-10-CM

## 2019-12-24 DIAGNOSIS — R55: Primary | ICD-10-CM

## 2019-12-24 DIAGNOSIS — R53.1: ICD-10-CM

## 2019-12-24 DIAGNOSIS — I10: ICD-10-CM

## 2019-12-24 DIAGNOSIS — I25.10: ICD-10-CM

## 2019-12-24 DIAGNOSIS — E78.5: ICD-10-CM

## 2019-12-24 DIAGNOSIS — Z95.2: ICD-10-CM

## 2019-12-24 LAB
ALBUMIN SERPL-MCNC: 3.5 G/DL (ref 3.4–5)
ALP SERPL-CCNC: 78 U/L (ref 46–116)
ALT SERPL-CCNC: 20 U/L (ref 10–68)
ANION GAP SERPL CALC-SCNC: 16.3 MMOL/L (ref 8–16)
APPEARANCE UR: CLEAR
APTT BLD: 31.6 SECONDS (ref 22.8–39.4)
BASOPHILS NFR BLD AUTO: 0.3 % (ref 0–2)
BILIRUB SERPL-MCNC: 0.52 MG/DL (ref 0.2–1.3)
BILIRUB SERPL-MCNC: NEGATIVE MG/DL
BUN SERPL-MCNC: 13 MG/DL (ref 7–18)
CALCIUM SERPL-MCNC: 9 MG/DL (ref 8.5–10.1)
CHLORIDE SERPL-SCNC: 104 MMOL/L (ref 98–107)
CK MB SERPL-MCNC: 0.3 U/L (ref 0–3.6)
CK SERPL-CCNC: 23 UL (ref 21–215)
CO2 SERPL-SCNC: 25.7 MMOL/L (ref 21–32)
COLOR UR: (no result)
CREAT SERPL-MCNC: 0.9 MG/DL (ref 0.6–1.3)
EOSINOPHIL NFR BLD: 1.7 % (ref 0–7)
ERYTHROCYTE [DISTWIDTH] IN BLOOD BY AUTOMATED COUNT: 14.2 % (ref 11.5–14.5)
GLOBULIN SER-MCNC: 3.8 G/L
GLUCOSE SERPL-MCNC: 99 MG/DL (ref 74–106)
GLUCOSE SERPL-MCNC: NEGATIVE MG/DL
HCT VFR BLD CALC: 29.1 % (ref 36–48)
HGB BLD-MCNC: 10 G/DL (ref 12–16)
IMM GRANULOCYTES NFR BLD: 0.1 % (ref 0–5)
INR PPP: 1.13 (ref 0.85–1.17)
KETONES UR STRIP-MCNC: NEGATIVE MG/DL
LYMPHOCYTES NFR BLD AUTO: 14.9 % (ref 15–50)
MAGNESIUM SERPL-MCNC: 2.1 MG/DL (ref 1.8–2.4)
MCH RBC QN AUTO: 33.3 PG (ref 26–34)
MCHC RBC AUTO-ENTMCNC: 34.4 G/DL (ref 31–37)
MCV RBC: 97 FL (ref 80–100)
MONOCYTES NFR BLD: 6.4 % (ref 2–11)
NEUTROPHILS NFR BLD AUTO: 76.6 % (ref 40–80)
NITRITE UR-MCNC: NEGATIVE MG/ML
OSMOLALITY SERPL CALC.SUM OF ELEC: 282 MOSM/KG (ref 275–300)
PH UR STRIP: 8 [PH] (ref 5–6)
PLATELET # BLD: 183 10X3/UL (ref 130–400)
PMV BLD AUTO: 10.1 FL (ref 7.4–10.4)
POTASSIUM SERPL-SCNC: 4 MMOL/L (ref 3.5–5.1)
PROT SERPL-MCNC: 7.3 G/DL (ref 6.4–8.2)
PROT UR-MCNC: NEGATIVE MG/DL
PROTHROMBIN TIME: 13.9 SECONDS (ref 11.6–15)
RBC # BLD AUTO: 3 10X6/UL (ref 4–5.4)
SODIUM SERPL-SCNC: 142 MMOL/L (ref 136–145)
SP GR UR STRIP: 1 (ref 1–1.02)
TROPONIN I SERPL-MCNC: 0.11 NG/ML (ref 0–0.06)
UROBILINOGEN UR-MCNC: NORMAL MG/DL
WBC # BLD AUTO: 7.2 10X3/UL (ref 4.8–10.8)

## 2019-12-24 NOTE — NUR
REPORT AND INITIAL ROUNDS COMPLETED. PT RESTING IN BED. NO DISTRESS. SR/BBB
PER TELEMETRY. IV TO RIGHT HAND WITH NS @ 125ML/HR. PT IS ALERT/ORIENTED AND
CONVERSES PLEASANTLY. PT TEACHING ON SAFETY, CALLING FOR ASSIST TO GO TO
BATHROOM. CALL LIGHT IN REACH. CPOC.

## 2019-12-25 VITALS — DIASTOLIC BLOOD PRESSURE: 53 MMHG | SYSTOLIC BLOOD PRESSURE: 139 MMHG

## 2019-12-25 VITALS — DIASTOLIC BLOOD PRESSURE: 76 MMHG | SYSTOLIC BLOOD PRESSURE: 143 MMHG

## 2019-12-25 VITALS — DIASTOLIC BLOOD PRESSURE: 64 MMHG | SYSTOLIC BLOOD PRESSURE: 148 MMHG

## 2019-12-25 VITALS — SYSTOLIC BLOOD PRESSURE: 148 MMHG | DIASTOLIC BLOOD PRESSURE: 60 MMHG

## 2019-12-25 VITALS — SYSTOLIC BLOOD PRESSURE: 168 MMHG | DIASTOLIC BLOOD PRESSURE: 72 MMHG

## 2019-12-25 VITALS — SYSTOLIC BLOOD PRESSURE: 151 MMHG | DIASTOLIC BLOOD PRESSURE: 55 MMHG

## 2019-12-25 VITALS — DIASTOLIC BLOOD PRESSURE: 68 MMHG | SYSTOLIC BLOOD PRESSURE: 157 MMHG

## 2019-12-25 LAB
ALBUMIN SERPL-MCNC: 2.8 G/DL (ref 3.4–5)
ALP SERPL-CCNC: 65 U/L (ref 46–116)
ALT SERPL-CCNC: 18 U/L (ref 10–68)
ANION GAP SERPL CALC-SCNC: 12.9 MMOL/L (ref 8–16)
BASOPHILS NFR BLD AUTO: 0.6 % (ref 0–2)
BILIRUB SERPL-MCNC: 0.34 MG/DL (ref 0.2–1.3)
BUN SERPL-MCNC: 11 MG/DL (ref 7–18)
CALCIUM SERPL-MCNC: 8.3 MG/DL (ref 8.5–10.1)
CHLORIDE SERPL-SCNC: 109 MMOL/L (ref 98–107)
CO2 SERPL-SCNC: 25 MMOL/L (ref 21–32)
CREAT SERPL-MCNC: 0.7 MG/DL (ref 0.6–1.3)
EOSINOPHIL NFR BLD: 4.4 % (ref 0–7)
ERYTHROCYTE [DISTWIDTH] IN BLOOD BY AUTOMATED COUNT: 14.4 % (ref 11.5–14.5)
GLOBULIN SER-MCNC: 3.4 G/L
GLUCOSE SERPL-MCNC: 91 MG/DL (ref 74–106)
HCT VFR BLD CALC: 25.8 % (ref 36–48)
HGB BLD-MCNC: 8.5 G/DL (ref 12–16)
IMM GRANULOCYTES NFR BLD: 0 % (ref 0–5)
LYMPHOCYTES NFR BLD AUTO: 24.4 % (ref 15–50)
MCH RBC QN AUTO: 32.4 PG (ref 26–34)
MCHC RBC AUTO-ENTMCNC: 32.9 G/DL (ref 31–37)
MCV RBC: 98.5 FL (ref 80–100)
MONOCYTES NFR BLD: 7.6 % (ref 2–11)
NEUTROPHILS NFR BLD AUTO: 63 % (ref 40–80)
OSMOLALITY SERPL CALC.SUM OF ELEC: 283 MOSM/KG (ref 275–300)
PLATELET # BLD: 172 10X3/UL (ref 130–400)
PMV BLD AUTO: 10.7 FL (ref 7.4–10.4)
POTASSIUM SERPL-SCNC: 3.9 MMOL/L (ref 3.5–5.1)
PROT SERPL-MCNC: 6.2 G/DL (ref 6.4–8.2)
RBC # BLD AUTO: 2.62 10X6/UL (ref 4–5.4)
SODIUM SERPL-SCNC: 143 MMOL/L (ref 136–145)
TROPONIN I SERPL-MCNC: 0.13 NG/ML (ref 0–0.06)
WBC # BLD AUTO: 5.3 10X3/UL (ref 4.8–10.8)

## 2019-12-25 NOTE — NUR
RECEIVED BEDSIDE REPORT. PATIENT IS ALERT AND ORIENTED, RESTING COMFORTABLY IN
BED. RESPIRATIONS ARE EVEN AND UNLABORED. NO S/S OF DISTRESS. NO C/OPAIN. CALL
LIGHT WITHIN REACH. WILL CPOC.

## 2019-12-25 NOTE — NUR
RECEIVED PT IN BED AAOX4 RESP UNLABORED SKIN W/D COLOR WNL PT DENIES ANY NEEDS
OR DISCOMFORT AT THIS TIME NAD NOTED

## 2019-12-26 VITALS — SYSTOLIC BLOOD PRESSURE: 115 MMHG | DIASTOLIC BLOOD PRESSURE: 54 MMHG

## 2019-12-26 VITALS — DIASTOLIC BLOOD PRESSURE: 82 MMHG | SYSTOLIC BLOOD PRESSURE: 197 MMHG

## 2019-12-26 VITALS — SYSTOLIC BLOOD PRESSURE: 130 MMHG | DIASTOLIC BLOOD PRESSURE: 55 MMHG

## 2019-12-26 NOTE — NUR
RECEIVED PT IN BED AAOX4 RESP UNLABORED SKIN W/D COLOR WNLDENIES ANY NEEDS OR
DISCOMFORT AT THIS TIME

## 2019-12-26 NOTE — HP
PATIENT: FADIA MARTINEZ                               MEDICAL RECORD: [id number]
ACCOUNT: [id number]                                    LOCATION:[address]
: 49                                            ADMISSION DATE: 19
                                                         PCP: NENO MAZARIEGOS MD    
 
                             HISTORY AND PHYSICAL EXAMINATION
 
 
REASON FOR ADMISSION:  Syncopal episode post-TAVR procedure.
 
HISTORY OF PRESENT ILLNESS:  The patient is a 74-year-old  female who a
week ago underwent a TAVR procedure.  She apparently required intubation for
this procedure and stayed in the hospital 24 hours at [facility] and was
discharged.  She said she did not feel well the next 2 days, but felt better
over the weekend.  She had gotten up this morning, went to the bathroom and was
getting a glass of water, when she said "oh, oh", felt like she was fainting and
she passed out.  She fell on her back and had a transient loss of consciousness.
 A friend was in the adjoining room, was in the bathroom within a minute and
Fadia was awakening at that point, there is no sign of incontinence.  She now
complains of some low back pain.  She denied chest pain.  Her friends took her
blood pressure and said she had a blood pressure of 170/50.  EMS brought her to
the ED.
 
PAST MEDICAL HISTORY:  Severe aortic insufficiency, most recent TAVR procedure,
history of CAD post-PTCA times 3 vessels, and catheterization 2018
showing patent grafts.  Carotid occlusive disease, peripheral vascular disease,
tricuspid valve regurgitation, mitral valve regurgitation, anxiety,
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
2017, acute myocardial infarction, essential hypertension, benign
colon polyps, hypothyroidism, GERD, and depression.
 
PAST SURGICAL HISTORY:  TAVR, CABG times 3 vessels, right carotid endarterectomy
and patch angioplasty per Dr. Craft in 2018.
 
SOCIAL HISTORY:  Socially, [place] alone [place] the [place].  She has a good friend who
is a retired nurse.  She is a former smoker, quit over 10 years ago.  Drinks an
occasional glass of wine.
 
HOME MEDICATIONS:  Plavix 75 mg a day, diltiazem  b.i.d., Cozaar 100 mg
daily, Pravachol 80 mg at bedtime, aspirin 81 mg a day, Celexa 20 mg a day,
Cymbalta 30 mg at bedtime, Lasix 40 mg p.o. q.a.m., famotidine 20 mg p.o. at
bedtime, probiotic 1 capsule daily, Carafate 1 gram p.o. b.i.d., Synthroid 50
mcg p.o. q.a.m. a.c., PreserVision 1 b.i.d.
 
FAMILY HISTORY:  Positive for hypertensive heart disease.
 
REVIEW OF SYSTEMS:
CONSTITUTIONAL:  He had been fatigued postoperative, better until today.  No
fever.
HEENT:  No recent visual change, sinus congestion, sore throat, or hearing
difficulty.
RESPIRATORY:  Improved breathing on exertion since her surgery.  Denies cough,
sputum production or pleuritic chest pain or hemoptysis.
CARDIAC:  No palpitations, PND, orthopnea, chest pain.
GASTROINTESTINAL:  No nausea, vomiting, change in stools.
GENITOURINARY:  No incontinence.
GYNECOLOGIC:  No vaginal bleeding.
PSYCHIATRIC:  Denies depressed mood.
 
 
 
HISTORY AND PHYSICAL                           [id number]    FADIA MARTINEZ       
 
 
NEUROLOGIC:  Denies headache, previous history of fainting.  Denies history of
seizures.
INTEGUMENT:  No rash or itching.
MUSCULOSKELETAL:  Complaining of low back pain at this time when she lays on her
right back.
 
PHYSICAL EXAMINATION:
VITAL SIGNS:  Temperature 97.7 Fahrenheit orally, blood pressure was 127/52 with
mild orthostasis, pulse 84, respirations 20, sats 99% on room air.
HEENT:  Eyes are clear.
NECK:  No bruits or masses.  Scar over the right carotid previous surgery.  No
JVD.
CHEST:  Distant breath sounds without wheeze or rales.
HEART:  Regular rate and rhythm with I/VI aortic systolic murmur.
LUNGS:  Clear.
ABDOMEN:  Soft, mildly obese, nontender.  No organomegaly.
EXTREMITIES:  No CC&E.
BACK:  She has a 4 x 3 inch hematoma just right of the mid lumbar spine, tender
to the touch.
NEUROLOGIC:  Oriented to person, place, and time.  Cranial nerves intact.  Gait
is normal.  Memory is intact.
 
LABORATORY DATA:  Shows a white count of 7200 with H&H of 10 and 29.1
respectively with a normal MCV.  Chemistry showed BUN and creatinine of 13 and
0.9, glucose 99, magnesium 2.1, troponin 0.111.  Urine is pending.  INR is 1.13.
 
DIAGNOSTIC DATA:  CT of the head is unremarkable.  CT of cervical spine shows no
acute abnormality.  Chest x-ray shows postoperative CABG, sternotomy wires, no
evidence of failure.
 
ASSESSMENT:
1.  Syncopal episode, etiology unknown.  She apparently has some mild
orthostasis and is anemic per her postoperative state, which may have
contributed.
2.  History of aortic stenosis, post-TAVR.
3.  Hypertension.
4.  Hyperlipidemia.
5.  Previous CAD with essentially negative heart cath in 2018.
6.  Hypothyroidism.
7.  Osteoarthritis.  Low back pain post-fall.
 
PLAN:  We will check lumbar spine series.  We will monitor overnight for
arrhythmia orthostatic blood pressures.  We will hold her losartan at this time
due to potential for hypotension, will be given IV fluids overnight.  Further
workup to follow.  We will repeat her echocardiogram.
 
TRANSINT:[id number] Voice Confirmation ID: [id number] DOCUMENT ID: [id number]
 
 
 
 
 
HISTORY AND PHYSICAL                           [id number]    FADIA MARTINEZ TIMOTHY MD           
 
 
 
Electronically Signed by NENO MAZARIEGOS on 19 at 0716
 
 
 
 
 
 
 
 
 
 
 
 
 
 
 
 
 
 
 
 
 
 
 
 
 
 
 
 
 
 
 
 
 
 
 
 
 
 
 
 
 
CC:                                                             [id number]
DICTATION DATE: 19 161     :     19 1649      ADM IN  
                                                                              
[address] [place] [address]

## 2019-12-26 NOTE — NUR
REVIEWED DISCHARGE INSTRUCTIONS WITH PT STATES UNDERSTANDING COPY  GIVEN DCD
SALINE LOCK TO RT HAND WITH IV CATHETER INTACT SITE FREE OF REDNESS OR EDEMA
PT DISCHARGED HOME LEFT UNIT VIA W/C IN STABLE CONDITION WITH ALL PERSONAL
BELONGINGS

## 2019-12-27 NOTE — MORECARE
CASE MANAGEMENT DISCHARGE SUMMARY
 
 
PATIENT: MAYRA MARTINEZ                   UNIT: [id number]
ACCOUNT#: [id number]                       ADM DATE: 19
AGE: 74     : [birth year]  SEX: F            ROOM/BED: D.6990    
AUTHOR: ERIC SHEA                             PHYSICIAN:                               
 
REFERRING PHYSICIAN: NENO MAZARIEGOS MD           
DATE OF SERVICE: 19
Discharge Plan
 
 
Patient Name: MAYRA MARTINEZ
Facility: [facility]
Encounter #: [id number]
Medical Record #: [id number]
: 1945
Planned Disposition: Home
Anticipated Discharge Date: 19
 
Discharge Date: 2019
Expected LOS: 2
Initial Reviewer: [id number]
Initial Review Date: 2019
Generated: 19   9:44 am 
  
 
 
 
 
 
 
 
Patient Name: MAYRA MARTINEZ
 
Encounter #: [id number]
Page [id number]
 
 
 
 
 
Electronically Signed by ERIC SHEA on 19 at 0845
 
 
 
 
 
 
**All edits/amendments must be made on the electronic document**
 
DICTATION DATE: 19     : DM  19     
RPT#: [id number]                                DC DATE:19
                                               STATUS: DIS IN  
[facility]
[address] [place] [address]
***END OF REPORT***

## 2020-01-09 ENCOUNTER — HOSPITAL ENCOUNTER (OUTPATIENT)
Dept: HOSPITAL 84 - D.CT | Age: 75
Discharge: HOME | End: 2020-01-09
Attending: THORACIC SURGERY (CARDIOTHORACIC VASCULAR SURGERY)
Payer: MEDICARE

## 2020-01-09 VITALS — BODY MASS INDEX: 26.6 KG/M2

## 2020-01-09 DIAGNOSIS — I65.29: Primary | ICD-10-CM

## 2020-01-23 ENCOUNTER — HOSPITAL ENCOUNTER (OUTPATIENT)
Dept: HOSPITAL 84 - D.CT | Age: 75
Discharge: HOME | End: 2020-01-23
Attending: THORACIC SURGERY (CARDIOTHORACIC VASCULAR SURGERY)
Payer: MEDICARE

## 2020-01-23 VITALS — BODY MASS INDEX: 26.6 KG/M2

## 2020-01-23 DIAGNOSIS — I65.23: Primary | ICD-10-CM

## 2022-02-18 NOTE — NUR
RESTING WITH EYES CLOSED, VSS. NO SIGN OF DISTRESS. The patient has been re-examined and I agree with the above assessment or I updated with my findings.
